# Patient Record
Sex: MALE | Race: BLACK OR AFRICAN AMERICAN | NOT HISPANIC OR LATINO | Employment: OTHER | URBAN - METROPOLITAN AREA
[De-identification: names, ages, dates, MRNs, and addresses within clinical notes are randomized per-mention and may not be internally consistent; named-entity substitution may affect disease eponyms.]

---

## 2021-03-05 ENCOUNTER — IMMUNIZATION (OUTPATIENT)
Dept: PRIMARY CARE CLINIC | Facility: CLINIC | Age: 66
End: 2021-03-05

## 2021-03-05 DIAGNOSIS — Z23 NEED FOR VACCINATION: Primary | ICD-10-CM

## 2021-03-05 PROCEDURE — 91303 PR SARSCOV2 VAC AD26 .5ML IM: CPT | Mod: S$GLB,,, | Performed by: INTERNAL MEDICINE

## 2021-03-05 PROCEDURE — 91303 PR SARSCOV2 VAC AD26 .5ML IM: ICD-10-PCS | Mod: S$GLB,,, | Performed by: INTERNAL MEDICINE

## 2021-03-05 PROCEDURE — 0031A PR IMMUNIZ ADMIN, SARS-COV-2 COVID-19 VACC, 5X10VP/0.5ML: CPT | Mod: CV19,S$GLB,, | Performed by: INTERNAL MEDICINE

## 2021-03-05 PROCEDURE — 0031A PR IMMUNIZ ADMIN, SARS-COV-2 COVID-19 VACC, 5X10VP/0.5ML: ICD-10-PCS | Mod: CV19,S$GLB,, | Performed by: INTERNAL MEDICINE

## 2021-08-31 ENCOUNTER — PATIENT OUTREACH (OUTPATIENT)
Dept: ADMINISTRATIVE | Facility: HOSPITAL | Age: 66
End: 2021-08-31

## 2021-09-14 ENCOUNTER — TELEPHONE (OUTPATIENT)
Dept: FAMILY MEDICINE | Facility: CLINIC | Age: 66
End: 2021-09-14

## 2021-09-14 ENCOUNTER — OFFICE VISIT (OUTPATIENT)
Dept: FAMILY MEDICINE | Facility: CLINIC | Age: 66
End: 2021-09-14
Payer: MEDICARE

## 2021-09-14 VITALS
SYSTOLIC BLOOD PRESSURE: 148 MMHG | DIASTOLIC BLOOD PRESSURE: 62 MMHG | BODY MASS INDEX: 24.47 KG/M2 | HEIGHT: 67 IN | WEIGHT: 155.88 LBS | HEART RATE: 45 BPM | OXYGEN SATURATION: 100 % | TEMPERATURE: 98 F

## 2021-09-14 DIAGNOSIS — Z00.00 ROUTINE GENERAL MEDICAL EXAMINATION AT A HEALTH CARE FACILITY: Primary | ICD-10-CM

## 2021-09-14 DIAGNOSIS — I10 HYPERTENSION, UNSPECIFIED TYPE: ICD-10-CM

## 2021-09-14 DIAGNOSIS — Z12.11 COLON CANCER SCREENING: ICD-10-CM

## 2021-09-14 DIAGNOSIS — R00.1 SINUS BRADYCARDIA: ICD-10-CM

## 2021-09-14 DIAGNOSIS — N40.0 BENIGN PROSTATIC HYPERPLASIA, UNSPECIFIED WHETHER LOWER URINARY TRACT SYMPTOMS PRESENT: ICD-10-CM

## 2021-09-14 DIAGNOSIS — I49.9 IRREGULAR HEART RATE: ICD-10-CM

## 2021-09-14 DIAGNOSIS — R94.31 ABNORMAL EKG: ICD-10-CM

## 2021-09-14 DIAGNOSIS — Z12.5 SCREENING FOR MALIGNANT NEOPLASM OF PROSTATE: ICD-10-CM

## 2021-09-14 PROCEDURE — 93005 ELECTROCARDIOGRAM TRACING: CPT | Mod: PBBFAC,PO | Performed by: INTERNAL MEDICINE

## 2021-09-14 PROCEDURE — 99999 PR PBB SHADOW E&M-EST. PATIENT-LVL III: ICD-10-PCS | Mod: PBBFAC,,, | Performed by: FAMILY MEDICINE

## 2021-09-14 PROCEDURE — 99387 INIT PM E/M NEW PAT 65+ YRS: CPT | Mod: S$PBB,,, | Performed by: FAMILY MEDICINE

## 2021-09-14 PROCEDURE — 99387 PR PREVENTIVE VISIT,NEW,65 & OVER: ICD-10-PCS | Mod: S$PBB,,, | Performed by: FAMILY MEDICINE

## 2021-09-14 PROCEDURE — 99213 OFFICE O/P EST LOW 20 MIN: CPT | Mod: PBBFAC,PO | Performed by: FAMILY MEDICINE

## 2021-09-14 PROCEDURE — 99999 PR PBB SHADOW E&M-EST. PATIENT-LVL III: CPT | Mod: PBBFAC,,, | Performed by: FAMILY MEDICINE

## 2021-09-14 PROCEDURE — 93010 EKG 12-LEAD: ICD-10-PCS | Mod: S$PBB,,, | Performed by: INTERNAL MEDICINE

## 2021-09-14 PROCEDURE — 90670 PCV13 VACCINE IM: CPT | Mod: PBBFAC,PO

## 2021-09-14 PROCEDURE — 93010 ELECTROCARDIOGRAM REPORT: CPT | Mod: S$PBB,,, | Performed by: INTERNAL MEDICINE

## 2021-09-14 PROCEDURE — G0009 ADMIN PNEUMOCOCCAL VACCINE: HCPCS | Mod: PBBFAC,PO

## 2021-09-14 RX ORDER — AMLODIPINE BESYLATE 5 MG/1
5 TABLET ORAL DAILY
Qty: 30 TABLET | Refills: 11 | Status: SHIPPED | OUTPATIENT
Start: 2021-09-14 | End: 2021-12-16 | Stop reason: SDUPTHER

## 2021-09-14 RX ORDER — TAMSULOSIN HYDROCHLORIDE 0.4 MG/1
0.4 CAPSULE ORAL DAILY
Qty: 90 CAPSULE | Refills: 3 | Status: SHIPPED | OUTPATIENT
Start: 2021-09-14 | End: 2022-10-25 | Stop reason: SDUPTHER

## 2021-09-29 ENCOUNTER — HOSPITAL ENCOUNTER (OUTPATIENT)
Dept: CARDIOLOGY | Facility: HOSPITAL | Age: 66
Discharge: HOME | End: 2021-09-29
Attending: FAMILY MEDICINE
Payer: MEDICARE

## 2021-09-29 ENCOUNTER — PATIENT MESSAGE (OUTPATIENT)
Dept: FAMILY MEDICINE | Facility: CLINIC | Age: 66
End: 2021-09-29

## 2021-09-29 ENCOUNTER — CLINICAL SUPPORT (OUTPATIENT)
Dept: LAB | Facility: HOSPITAL | Age: 66
End: 2021-09-29
Attending: FAMILY MEDICINE
Payer: MEDICARE

## 2021-09-29 VITALS — WEIGHT: 155 LBS | BODY MASS INDEX: 24.33 KG/M2 | HEIGHT: 67 IN

## 2021-09-29 DIAGNOSIS — I49.9 IRREGULAR HEART RATE: ICD-10-CM

## 2021-09-29 DIAGNOSIS — R94.31 ABNORMAL EKG: ICD-10-CM

## 2021-09-29 DIAGNOSIS — R97.20 ELEVATED PSA: Primary | ICD-10-CM

## 2021-09-29 DIAGNOSIS — R00.1 SINUS BRADYCARDIA: ICD-10-CM

## 2021-09-29 LAB
AORTIC ROOT ANNULUS: 2.73 CM
AORTIC VALVE CUSP SEPERATION: 1.76 CM
AV INDEX (PROSTH): 0.55
AV MEAN GRADIENT: 6 MMHG
AV PEAK GRADIENT: 11 MMHG
AV VALVE AREA: 1.78 CM2
AV VELOCITY RATIO: 0.5
BSA FOR ECHO PROCEDURE: 1.82 M2
CV ECHO LV RWT: 0.34 CM
DOP CALC AO PEAK VEL: 1.69 M/S
DOP CALC AO VTI: 37.38 CM
DOP CALC LVOT AREA: 3.2 CM2
DOP CALC LVOT DIAMETER: 2.02 CM
DOP CALC LVOT PEAK VEL: 0.84 M/S
DOP CALC LVOT STROKE VOLUME: 66.43 CM3
DOP CALC MV VTI: 44.28 CM
DOP CALCLVOT PEAK VEL VTI: 20.74 CM
E WAVE DECELERATION TIME: 271.9 MSEC
E/A RATIO: 0.79
E/E' RATIO: 7.33 M/S
ECHO LV POSTERIOR WALL: 0.94 CM (ref 0.6–1.1)
EJECTION FRACTION: 60 %
FRACTIONAL SHORTENING: 32 % (ref 28–44)
INTERVENTRICULAR SEPTUM: 0.74 CM (ref 0.6–1.1)
IVRT: 105.61 MSEC
LA MAJOR: 5.69 CM
LA MINOR: 6.03 CM
LA WIDTH: 4.17 CM
LEFT ATRIUM SIZE: 3.38 CM
LEFT ATRIUM VOLUME INDEX MOD: 32.8 ML/M2
LEFT ATRIUM VOLUME INDEX: 38.8 ML/M2
LEFT ATRIUM VOLUME MOD: 59.31 CM3
LEFT ATRIUM VOLUME: 70.15 CM3
LEFT INTERNAL DIMENSION IN SYSTOLE: 3.79 CM (ref 2.1–4)
LEFT VENTRICLE DIASTOLIC VOLUME INDEX: 84.55 ML/M2
LEFT VENTRICLE DIASTOLIC VOLUME: 153.03 ML
LEFT VENTRICLE MASS INDEX: 97 G/M2
LEFT VENTRICLE SYSTOLIC VOLUME INDEX: 34 ML/M2
LEFT VENTRICLE SYSTOLIC VOLUME: 61.5 ML
LEFT VENTRICULAR INTERNAL DIMENSION IN DIASTOLE: 5.59 CM (ref 3.5–6)
LEFT VENTRICULAR MASS: 174.96 G
LV LATERAL E/E' RATIO: 5.5 M/S
LV SEPTAL E/E' RATIO: 11 M/S
MV MEAN GRADIENT: 0 MMHG
MV PEAK A VEL: 0.7 M/S
MV PEAK E VEL: 0.55 M/S
MV PEAK GRADIENT: 2 MMHG
MV STENOSIS PRESSURE HALF TIME: 78.85 MS
MV VALVE AREA BY CONTINUITY EQUATION: 1.5 CM2
MV VALVE AREA P 1/2 METHOD: 2.79 CM2
PISA MRMAX VEL: 0.05 M/S
PISA TR MAX VEL: 2.53 M/S
PV PEAK VELOCITY: 1.19 CM/S
RA MAJOR: 4.73 CM
RA PRESSURE: 3 MMHG
RA WIDTH: 4.22 CM
RIGHT VENTRICULAR END-DIASTOLIC DIMENSION: 2.16 CM
TDI LATERAL: 0.1 M/S
TDI SEPTAL: 0.05 M/S
TDI: 0.08 M/S
TR MAX PG: 26 MMHG
TV REST PULMONARY ARTERY PRESSURE: 29 MMHG

## 2021-09-29 PROCEDURE — 93010 EKG 12-LEAD: ICD-10-PCS | Mod: ,,, | Performed by: INTERNAL MEDICINE

## 2021-09-29 PROCEDURE — 93010 ELECTROCARDIOGRAM REPORT: CPT | Mod: ,,, | Performed by: INTERNAL MEDICINE

## 2021-09-29 PROCEDURE — 93306 TTE W/DOPPLER COMPLETE: CPT | Mod: 26,,, | Performed by: INTERNAL MEDICINE

## 2021-09-29 PROCEDURE — 93005 ELECTROCARDIOGRAM TRACING: CPT

## 2021-09-29 PROCEDURE — 93306 ECHO (CUPID ONLY): ICD-10-PCS | Mod: 26,,, | Performed by: INTERNAL MEDICINE

## 2021-09-29 PROCEDURE — 93306 TTE W/DOPPLER COMPLETE: CPT

## 2021-10-01 DIAGNOSIS — I49.9 IRREGULAR HEART RATE: Primary | ICD-10-CM

## 2021-10-04 ENCOUNTER — TELEPHONE (OUTPATIENT)
Dept: FAMILY MEDICINE | Facility: CLINIC | Age: 66
End: 2021-10-04

## 2021-10-20 ENCOUNTER — TELEPHONE (OUTPATIENT)
Dept: GASTROENTEROLOGY | Facility: CLINIC | Age: 66
End: 2021-10-20

## 2021-11-02 ENCOUNTER — TELEPHONE (OUTPATIENT)
Dept: ENDOSCOPY | Facility: HOSPITAL | Age: 66
End: 2021-11-02
Payer: MEDICARE

## 2021-11-02 ENCOUNTER — TELEPHONE (OUTPATIENT)
Dept: FAMILY MEDICINE | Facility: CLINIC | Age: 66
End: 2021-11-02
Payer: MEDICARE

## 2021-11-02 RX ORDER — SODIUM, POTASSIUM,MAG SULFATES 17.5-3.13G
1 SOLUTION, RECONSTITUTED, ORAL ORAL DAILY
Qty: 1 KIT | Refills: 0 | Status: SHIPPED | OUTPATIENT
Start: 2021-11-02 | End: 2021-11-04

## 2021-11-10 ENCOUNTER — IMMUNIZATION (OUTPATIENT)
Dept: PRIMARY CARE CLINIC | Facility: CLINIC | Age: 66
End: 2021-11-10
Payer: MEDICARE

## 2021-11-10 DIAGNOSIS — Z23 NEED FOR VACCINATION: Primary | ICD-10-CM

## 2021-11-10 PROCEDURE — 0064A COVID-19, MRNA, LNP-S, PF, 100 MCG/0.25 ML DOSE VACCINE (MODERNA BOOSTER): CPT | Mod: CV19,PBBFAC | Performed by: INTERNAL MEDICINE

## 2021-11-19 ENCOUNTER — OFFICE VISIT (OUTPATIENT)
Dept: UROLOGY | Facility: CLINIC | Age: 66
End: 2021-11-19
Payer: MEDICARE

## 2021-11-19 VITALS
SYSTOLIC BLOOD PRESSURE: 144 MMHG | HEIGHT: 67 IN | WEIGHT: 155 LBS | DIASTOLIC BLOOD PRESSURE: 92 MMHG | HEART RATE: 51 BPM | BODY MASS INDEX: 24.33 KG/M2

## 2021-11-19 DIAGNOSIS — R97.20 ELEVATED PSA: ICD-10-CM

## 2021-11-19 DIAGNOSIS — R35.0 FREQUENCY OF URINATION: ICD-10-CM

## 2021-11-19 PROCEDURE — 99999 PR PBB SHADOW E&M-EST. PATIENT-LVL III: ICD-10-PCS | Mod: PBBFAC,,, | Performed by: UROLOGY

## 2021-11-19 PROCEDURE — 99205 PR OFFICE/OUTPT VISIT, NEW, LEVL V, 60-74 MIN: ICD-10-PCS | Mod: S$PBB,,, | Performed by: UROLOGY

## 2021-11-19 PROCEDURE — 99213 OFFICE O/P EST LOW 20 MIN: CPT | Mod: PBBFAC,PO | Performed by: UROLOGY

## 2021-11-19 PROCEDURE — 99205 OFFICE O/P NEW HI 60 MIN: CPT | Mod: S$PBB,,, | Performed by: UROLOGY

## 2021-11-19 PROCEDURE — 87086 URINE CULTURE/COLONY COUNT: CPT | Performed by: UROLOGY

## 2021-11-19 PROCEDURE — 99999 PR PBB SHADOW E&M-EST. PATIENT-LVL III: CPT | Mod: PBBFAC,,, | Performed by: UROLOGY

## 2021-11-19 RX ORDER — CIPROFLOXACIN 500 MG/1
500 TABLET ORAL 2 TIMES DAILY
Qty: 4 TABLET | Refills: 0 | Status: SHIPPED | OUTPATIENT
Start: 2021-11-19 | End: 2021-11-21

## 2021-11-21 LAB — BACTERIA UR CULT: NO GROWTH

## 2021-11-24 ENCOUNTER — HOSPITAL ENCOUNTER (OUTPATIENT)
Dept: RADIOLOGY | Facility: HOSPITAL | Age: 66
Discharge: HOME | End: 2021-11-24
Attending: UROLOGY
Payer: MEDICARE

## 2021-11-24 DIAGNOSIS — R97.20 ELEVATED PSA: ICD-10-CM

## 2021-11-24 PROCEDURE — 78306 NM BONE SCAN WHOLE BODY: ICD-10-PCS | Mod: 26,,, | Performed by: STUDENT IN AN ORGANIZED HEALTH CARE EDUCATION/TRAINING PROGRAM

## 2021-11-24 PROCEDURE — 78306 BONE IMAGING WHOLE BODY: CPT | Mod: 26,,, | Performed by: STUDENT IN AN ORGANIZED HEALTH CARE EDUCATION/TRAINING PROGRAM

## 2021-11-24 PROCEDURE — A9503 TC99M MEDRONATE: HCPCS

## 2021-11-24 PROCEDURE — 78306 BONE IMAGING WHOLE BODY: CPT | Mod: TC

## 2021-11-29 ENCOUNTER — TELEPHONE (OUTPATIENT)
Dept: UROLOGY | Facility: CLINIC | Age: 66
End: 2021-11-29
Payer: MEDICARE

## 2021-11-29 RX ORDER — CIPROFLOXACIN 500 MG/1
500 TABLET ORAL 2 TIMES DAILY
Qty: 4 TABLET | Refills: 0 | Status: SHIPPED | OUTPATIENT
Start: 2021-12-12 | End: 2021-12-14

## 2021-12-06 ENCOUNTER — HOSPITAL ENCOUNTER (OUTPATIENT)
Dept: RADIOLOGY | Facility: HOSPITAL | Age: 66
Discharge: HOME | End: 2021-12-06
Attending: UROLOGY
Payer: MEDICARE

## 2021-12-06 DIAGNOSIS — R97.20 ELEVATED PSA: ICD-10-CM

## 2021-12-06 PROCEDURE — 74178 CT ABDOMEN PELVIS W WO CONTRAST: ICD-10-PCS | Mod: 26,,, | Performed by: RADIOLOGY

## 2021-12-06 PROCEDURE — 74178 CT ABD&PLV WO CNTR FLWD CNTR: CPT | Mod: 26,,, | Performed by: RADIOLOGY

## 2021-12-06 PROCEDURE — 25500020 PHARM REV CODE 255: Performed by: UROLOGY

## 2021-12-06 PROCEDURE — 74178 CT ABD&PLV WO CNTR FLWD CNTR: CPT | Mod: TC

## 2021-12-06 RX ADMIN — IOHEXOL 75 ML: 350 INJECTION, SOLUTION INTRAVENOUS at 04:12

## 2021-12-10 ENCOUNTER — TELEPHONE (OUTPATIENT)
Dept: UROLOGY | Facility: CLINIC | Age: 66
End: 2021-12-10
Payer: MEDICARE

## 2021-12-13 ENCOUNTER — TELEPHONE (OUTPATIENT)
Dept: UROLOGY | Facility: CLINIC | Age: 66
End: 2021-12-13
Payer: MEDICARE

## 2021-12-16 ENCOUNTER — OFFICE VISIT (OUTPATIENT)
Dept: FAMILY MEDICINE | Facility: CLINIC | Age: 66
End: 2021-12-16
Payer: MEDICARE

## 2021-12-16 VITALS
HEART RATE: 53 BPM | SYSTOLIC BLOOD PRESSURE: 166 MMHG | BODY MASS INDEX: 25.06 KG/M2 | DIASTOLIC BLOOD PRESSURE: 92 MMHG | OXYGEN SATURATION: 99 % | HEIGHT: 67 IN | WEIGHT: 159.63 LBS

## 2021-12-16 DIAGNOSIS — I10 HYPERTENSION, UNSPECIFIED TYPE: ICD-10-CM

## 2021-12-16 DIAGNOSIS — R00.1 SINUS BRADYCARDIA: ICD-10-CM

## 2021-12-16 DIAGNOSIS — R97.20 ELEVATED PSA: Primary | ICD-10-CM

## 2021-12-16 PROCEDURE — 99999 PR PBB SHADOW E&M-EST. PATIENT-LVL III: CPT | Mod: PBBFAC,,, | Performed by: FAMILY MEDICINE

## 2021-12-16 PROCEDURE — 99999 PR PBB SHADOW E&M-EST. PATIENT-LVL III: ICD-10-PCS | Mod: PBBFAC,,, | Performed by: FAMILY MEDICINE

## 2021-12-16 PROCEDURE — 99214 OFFICE O/P EST MOD 30 MIN: CPT | Mod: 25,S$PBB,, | Performed by: FAMILY MEDICINE

## 2021-12-16 PROCEDURE — 99214 PR OFFICE/OUTPT VISIT, EST, LEVL IV, 30-39 MIN: ICD-10-PCS | Mod: 25,S$PBB,, | Performed by: FAMILY MEDICINE

## 2021-12-16 PROCEDURE — 99213 OFFICE O/P EST LOW 20 MIN: CPT | Mod: PBBFAC,PO,25 | Performed by: FAMILY MEDICINE

## 2021-12-16 PROCEDURE — G0008 ADMIN INFLUENZA VIRUS VAC: HCPCS | Mod: PBBFAC,PO

## 2021-12-16 PROCEDURE — 90662 IIV NO PRSV INCREASED AG IM: CPT | Mod: PBBFAC,PO

## 2021-12-16 RX ORDER — AMLODIPINE BESYLATE 10 MG/1
10 TABLET ORAL DAILY
Qty: 30 TABLET | Refills: 11 | Status: SHIPPED | OUTPATIENT
Start: 2021-12-16 | End: 2023-03-07 | Stop reason: SDUPTHER

## 2021-12-20 ENCOUNTER — TELEPHONE (OUTPATIENT)
Dept: ENDOSCOPY | Facility: HOSPITAL | Age: 66
End: 2021-12-20
Payer: MEDICARE

## 2021-12-22 ENCOUNTER — HOSPITAL ENCOUNTER (OUTPATIENT)
Facility: HOSPITAL | Age: 66
Discharge: HOME | End: 2021-12-22
Attending: INTERNAL MEDICINE | Admitting: INTERNAL MEDICINE
Payer: MEDICARE

## 2021-12-22 ENCOUNTER — ANESTHESIA EVENT (OUTPATIENT)
Dept: ENDOSCOPY | Facility: HOSPITAL | Age: 66
End: 2021-12-22
Payer: MEDICARE

## 2021-12-22 ENCOUNTER — ANESTHESIA (OUTPATIENT)
Dept: ENDOSCOPY | Facility: HOSPITAL | Age: 66
End: 2021-12-22
Payer: MEDICARE

## 2021-12-22 VITALS
WEIGHT: 158 LBS | TEMPERATURE: 98 F | HEIGHT: 69 IN | BODY MASS INDEX: 23.4 KG/M2 | OXYGEN SATURATION: 97 % | SYSTOLIC BLOOD PRESSURE: 133 MMHG | RESPIRATION RATE: 17 BRPM | HEART RATE: 49 BPM | DIASTOLIC BLOOD PRESSURE: 86 MMHG

## 2021-12-22 DIAGNOSIS — Z12.11 COLON CANCER SCREENING: ICD-10-CM

## 2021-12-22 PROCEDURE — 63600175 PHARM REV CODE 636 W HCPCS: Performed by: NURSE ANESTHETIST, CERTIFIED REGISTERED

## 2021-12-22 PROCEDURE — 37000008 HC ANESTHESIA 1ST 15 MINUTES: Performed by: INTERNAL MEDICINE

## 2021-12-22 PROCEDURE — 27201089 HC SNARE, DISP (ANY): Performed by: INTERNAL MEDICINE

## 2021-12-22 PROCEDURE — 45385 COLONOSCOPY W/LESION REMOVAL: CPT | Mod: PT,,, | Performed by: INTERNAL MEDICINE

## 2021-12-22 PROCEDURE — 88305 TISSUE EXAM BY PATHOLOGIST: CPT | Mod: 26,,, | Performed by: STUDENT IN AN ORGANIZED HEALTH CARE EDUCATION/TRAINING PROGRAM

## 2021-12-22 PROCEDURE — 88305 TISSUE EXAM BY PATHOLOGIST: ICD-10-PCS | Mod: 26,,, | Performed by: STUDENT IN AN ORGANIZED HEALTH CARE EDUCATION/TRAINING PROGRAM

## 2021-12-22 PROCEDURE — 25000003 PHARM REV CODE 250: Performed by: NURSE ANESTHETIST, CERTIFIED REGISTERED

## 2021-12-22 PROCEDURE — 45385 PR COLONOSCOPY,REMV LESN,SNARE: ICD-10-PCS | Mod: PT,,, | Performed by: INTERNAL MEDICINE

## 2021-12-22 PROCEDURE — 88305 TISSUE EXAM BY PATHOLOGIST: CPT | Performed by: STUDENT IN AN ORGANIZED HEALTH CARE EDUCATION/TRAINING PROGRAM

## 2021-12-22 PROCEDURE — 37000009 HC ANESTHESIA EA ADD 15 MINS: Performed by: INTERNAL MEDICINE

## 2021-12-22 PROCEDURE — 45385 COLONOSCOPY W/LESION REMOVAL: CPT | Mod: PT | Performed by: INTERNAL MEDICINE

## 2021-12-22 PROCEDURE — 25000003 PHARM REV CODE 250: Performed by: INTERNAL MEDICINE

## 2021-12-22 RX ORDER — LIDOCAINE HYDROCHLORIDE 20 MG/ML
INJECTION INTRAVENOUS
Status: DISCONTINUED | OUTPATIENT
Start: 2021-12-22 | End: 2021-12-22

## 2021-12-22 RX ORDER — PROPOFOL 10 MG/ML
VIAL (ML) INTRAVENOUS CONTINUOUS PRN
Status: DISCONTINUED | OUTPATIENT
Start: 2021-12-22 | End: 2021-12-22

## 2021-12-22 RX ORDER — SODIUM CHLORIDE 0.9 % (FLUSH) 0.9 %
10 SYRINGE (ML) INJECTION
Status: DISCONTINUED | OUTPATIENT
Start: 2021-12-22 | End: 2021-12-22 | Stop reason: HOSPADM

## 2021-12-22 RX ORDER — SODIUM CHLORIDE 9 MG/ML
INJECTION, SOLUTION INTRAVENOUS CONTINUOUS
Status: DISCONTINUED | OUTPATIENT
Start: 2021-12-22 | End: 2021-12-22 | Stop reason: HOSPADM

## 2021-12-22 RX ORDER — PROPOFOL 10 MG/ML
VIAL (ML) INTRAVENOUS
Status: DISCONTINUED | OUTPATIENT
Start: 2021-12-22 | End: 2021-12-22

## 2021-12-22 RX ADMIN — PROPOFOL 150 MCG/KG/MIN: 10 INJECTION, EMULSION INTRAVENOUS at 08:12

## 2021-12-22 RX ADMIN — PROPOFOL 70 MG: 10 INJECTION, EMULSION INTRAVENOUS at 08:12

## 2021-12-22 RX ADMIN — LIDOCAINE HYDROCHLORIDE 60 MG: 20 INJECTION, SOLUTION INTRAVENOUS at 08:12

## 2021-12-22 RX ADMIN — SODIUM CHLORIDE: 0.9 INJECTION, SOLUTION INTRAVENOUS at 08:12

## 2021-12-22 NOTE — H&P
Short Stay Endoscopy History and Physical    PCP - Demetri Thomason MD    Procedure - Colonoscopy  ASA - III  Mallampati - per anesthesia  History of Anesthesia problems - no  Family history Anesthesia problems - no     HPI:  This is a 66 y.o. male here for evaluation of : Colon cancer screening    Family history of colon cancer - no  History of polyps - na  Change in bowel habits - no  Rectal bleeding - no      ROS:  Constitutional: No fevers, chills, No weight loss  ENT: No allergies  CV: No chest pain  Pulm: No shortness of breath  GI: see HPI  Derm: No rash    Medical History:  has a past medical history of HTN (hypertension).    Surgical History:  has a past surgical history that includes hand surgery.    Family History: family history is not on file.. Otherwise no colon cancer, inflammatory bowel disease, or GI malignancies.    Social History:  reports that he has never smoked. He has never used smokeless tobacco. He reports current drug use. Drug: Marijuana. He reports that he does not drink alcohol.    Review of patient's allergies indicates:   Allergen Reactions    Nyquil liquicaps Swelling       Medications:   Medications Prior to Admission   Medication Sig Dispense Refill Last Dose    amLODIPine (NORVASC) 10 MG tablet Take 1 tablet (10 mg total) by mouth once daily. 30 tablet 11     tamsulosin (FLOMAX) 0.4 mg Cap Take 1 capsule (0.4 mg total) by mouth once daily. 90 capsule 3          Objective Findings:    Vital Signs: see nursing notes  Physical Exam:  General Appearance: Well appearing in no acute distress  Eyes:    No scleral icterus  ENT: Neck supple  Lungs: CTA anteriorly  Heart:  S1, S2 normal, no murmurs heard  Abdomen: Soft, non tender, non distended with positive bowel sounds. No hepatosplenomegaly, ascites, or mass  Extremities: no edema  Skin: No rash      Labs:  Lab Results   Component Value Date    WBC 4.17 09/29/2021    HGB 13.1 (L) 09/29/2021    HCT 40.9 09/29/2021     09/29/2021     CHOL 207 (H) 09/29/2021    TRIG 88 09/29/2021    HDL 65 09/29/2021    ALT 25 09/29/2021    AST 34 09/29/2021     11/24/2021    K 4.2 11/24/2021     11/24/2021    CREATININE 1.4 11/24/2021    BUN 28 (H) 11/24/2021    CO2 29 11/24/2021    TSH 1.116 09/29/2021    .3 (H) 09/29/2021       I have explained the risks and benefits of endoscopy procedures to the patient including but not limited to bleeding, perforation, infection, and death.    Lavon Solis MD

## 2021-12-22 NOTE — PROVATION PATIENT INSTRUCTIONS
Discharge Summary/Instructions after an Endoscopic Procedure  Patient Name: Jm Gilliam  Patient MRN: [id number]  Patient YOB: 1955  Wednesday, December 22, 2021  Lavon Solis MD  Dear patient,  As a result of recent federal legislation (The Federal Cures Act), you may   receive lab or pathology results from your procedure in your [facility]   account before your physician is able to contact you. Your physician or   their representative will relay the results to you with their   recommendations at their soonest availability.  Thank you,  Your health is very important to us during the Covid Crisis. Following your   procedure today, you will receive a daily text for 2 weeks asking about   signs or symptoms of Covid 19.  Please respond to this text when you   receive it so we can follow up and keep you as safe as possible.   RESTRICTIONS:  During your procedure today, you received medications for sedation.  These   medications may affect your judgment, balance and coordination.  Therefore,   for 24 hours, you have the following restrictions:   - DO NOT drive a car, operate machinery, make legal/financial decisions,   sign important papers or drink alcohol.    ACTIVITY:  Today: no heavy lifting, straining or running due to procedural   sedation/anesthesia.  The following day: return to full activity including work.  DIET:  Eat and drink normally unless instructed otherwise.     TREATMENT FOR COMMON SIDE EFFECTS:  - Mild abdominal pain, nausea, belching, bloating or excessive gas:  rest,   eat lightly and use a heating pad.  - Sore Throat: treat with throat lozenges and/or gargle with warm salt   water.  - Because air was used during the procedure, expelling large amounts of air   from your rectum or belching is normal.  - If a bowel prep was taken, you may not have a bowel movement for 1-3 days.    This is normal.  SYMPTOMS TO WATCH FOR AND REPORT TO YOUR PHYSICIAN:  1. Abdominal pain or bloating,  other than gas cramps.  2. Chest pain.  3. Back pain.  4. Signs of infection such as: chills or fever occurring within 24 hours   after the procedure.  5. Rectal bleeding, which would show as bright red, maroon, or black stools.   (A tablespoon of blood from the rectum is not serious, especially if   hemorrhoids are present.)  6. Vomiting.  7. Weakness or dizziness.  GO DIRECTLY TO THE NEAREST EMERGENCY ROOM IF YOU HAVE ANY OF THE FOLLOWING:      Difficulty breathing              Chills and/or fever over 101 F   Persistent vomiting and/or vomiting blood   Severe abdominal pain   Severe chest pain   Black, tarry stools   Bleeding- more than one tablespoon   Any other symptom or condition that you feel may need urgent attention  Your doctor recommends these additional instructions:  If any biopsies were taken, your doctors clinic will contact you in 1 to 2   weeks with any results.  - Discharge patient to home.   - Resume previous diet.   - Continue present medications.   - Await pathology results.   - Repeat colonoscopy in 3 years for surveillance.   - Patient has a contact number available for emergencies.  The signs and   symptoms of potential delayed complications were discussed with the   patient.  Return to normal activities tomorrow.  Written discharge   instructions were provided to the patient.  For questions, problems or results please call your physician - Lavon Solis MD.  EMERGENCY PHONE NUMBER: [phone],  LAB RESULTS: [phone]  IF A COMPLICATION OR EMERGENCY SITUATION ARISES AND YOU ARE UNABLE TO REACH   YOUR PHYSICIAN - GO DIRECTLY TO THE EMERGENCY ROOM.  Lavon Solis MD  12/22/2021 9:15:14 AM  This report has been verified and signed electronically.  Dear patient,  As a result of recent federal legislation (The Federal Cures Act), you may   receive lab or pathology results from your procedure in your [facility]   account before your physician is able to contact you.  Your physician or   their representative will relay the results to you with their   recommendations at their soonest availability.  Thank you,  [employer]OVATION

## 2021-12-23 ENCOUNTER — TELEPHONE (OUTPATIENT)
Dept: ENDOSCOPY | Facility: HOSPITAL | Age: 66
End: 2021-12-23
Payer: MEDICARE

## 2022-01-01 LAB
FINAL PATHOLOGIC DIAGNOSIS: NORMAL
GROSS: NORMAL
Lab: NORMAL

## 2022-01-03 ENCOUNTER — TELEPHONE (OUTPATIENT)
Dept: GASTROENTEROLOGY | Facility: CLINIC | Age: 67
End: 2022-01-03
Payer: MEDICARE

## 2022-01-03 NOTE — TELEPHONE ENCOUNTER
----- Message from Lavon Solis MD sent at 1/3/2022  1:39 PM CST -----  Pathology from recent colonoscopy reviewed.  Colon polyp(s) benign.  Repeat colonoscopy in 3 years.

## 2022-01-03 NOTE — PROGRESS NOTES
Pathology from recent colonoscopy reviewed.  Colon polyp(s) benign.  Repeat colonoscopy in 3 years.

## 2022-01-21 ENCOUNTER — TELEPHONE (OUTPATIENT)
Dept: UROLOGY | Facility: CLINIC | Age: 67
End: 2022-01-21
Payer: MEDICARE

## 2022-01-24 ENCOUNTER — PROCEDURE VISIT (OUTPATIENT)
Dept: UROLOGY | Facility: CLINIC | Age: 67
End: 2022-01-24
Payer: MEDICARE

## 2022-01-24 VITALS
HEIGHT: 67 IN | DIASTOLIC BLOOD PRESSURE: 101 MMHG | HEART RATE: 64 BPM | BODY MASS INDEX: 24.44 KG/M2 | TEMPERATURE: 98 F | RESPIRATION RATE: 16 BRPM | SYSTOLIC BLOOD PRESSURE: 167 MMHG | WEIGHT: 155.69 LBS

## 2022-01-24 DIAGNOSIS — R97.20 ELEVATED PSA: Primary | ICD-10-CM

## 2022-01-24 PROCEDURE — 88305 TISSUE EXAM BY PATHOLOGIST: CPT | Performed by: PATHOLOGY

## 2022-01-24 PROCEDURE — 96372 THER/PROPH/DIAG INJ SC/IM: CPT | Mod: PBBFAC,59

## 2022-01-24 PROCEDURE — G0416 PROSTATE BIOPSY, ANY MTHD: ICD-10-PCS | Mod: 26,,, | Performed by: PATHOLOGY

## 2022-01-24 PROCEDURE — 55700 TRUS: ICD-10-PCS | Mod: S$PBB,,, | Performed by: UROLOGY

## 2022-01-24 PROCEDURE — 88342 CHG IMMUNOCYTOCHEMISTRY: ICD-10-PCS | Mod: 26,,, | Performed by: PATHOLOGY

## 2022-01-24 PROCEDURE — 76872 US TRANSRECTAL: CPT | Mod: 26,S$PBB,, | Performed by: UROLOGY

## 2022-01-24 PROCEDURE — 88341 PR IHC OR ICC EACH ADD'L SINGLE ANTIBODY  STAINPR: ICD-10-PCS | Mod: 26,,, | Performed by: PATHOLOGY

## 2022-01-24 PROCEDURE — 88342 IMHCHEM/IMCYTCHM 1ST ANTB: CPT | Mod: 26,,, | Performed by: PATHOLOGY

## 2022-01-24 PROCEDURE — 76872 TRUS: ICD-10-PCS | Mod: 26,S$PBB,, | Performed by: UROLOGY

## 2022-01-24 PROCEDURE — G0416 PROSTATE BIOPSY, ANY MTHD: HCPCS | Mod: 26,,, | Performed by: PATHOLOGY

## 2022-01-24 PROCEDURE — 88342 IMHCHEM/IMCYTCHM 1ST ANTB: CPT | Mod: 59 | Performed by: PATHOLOGY

## 2022-01-24 PROCEDURE — 88341 IMHCHEM/IMCYTCHM EA ADD ANTB: CPT | Mod: 26,,, | Performed by: PATHOLOGY

## 2022-01-24 PROCEDURE — 88341 IMHCHEM/IMCYTCHM EA ADD ANTB: CPT | Mod: 59 | Performed by: PATHOLOGY

## 2022-01-24 PROCEDURE — 55700 TRUS: CPT | Mod: PBBFAC | Performed by: UROLOGY

## 2022-01-24 RX ORDER — LIDOCAINE HYDROCHLORIDE 10 MG/ML
20 INJECTION INFILTRATION; PERINEURAL
Status: COMPLETED | OUTPATIENT
Start: 2022-01-24 | End: 2022-01-24

## 2022-01-24 RX ORDER — GENTAMICIN SULFATE 40 MG/ML
80 INJECTION, SOLUTION INTRAMUSCULAR; INTRAVENOUS
Status: COMPLETED | OUTPATIENT
Start: 2022-01-24 | End: 2022-01-24

## 2022-01-24 RX ORDER — LIDOCAINE HYDROCHLORIDE 20 MG/ML
JELLY TOPICAL
Status: COMPLETED | OUTPATIENT
Start: 2022-01-24 | End: 2022-01-24

## 2022-01-24 RX ADMIN — GENTAMICIN SULFATE 80 MG: 40 INJECTION, SOLUTION INTRAMUSCULAR; INTRAVENOUS at 12:01

## 2022-01-24 RX ADMIN — LIDOCAINE HYDROCHLORIDE 20 ML: 10 INJECTION, SOLUTION INFILTRATION; PERINEURAL at 12:01

## 2022-01-24 RX ADMIN — LIDOCAINE HYDROCHLORIDE: 20 JELLY TOPICAL at 12:01

## 2022-01-24 NOTE — PROCEDURES
"TRUS    Date/Time: 1/24/2022 12:48 PM  Performed by: Glenroy Blakely MD  Authorized by: Glenroy Blakely MD     Consent Done?:  Yes (Written)  Time out: Immediately prior to procedure a "time out" was called to verify the correct patient, procedure, equipment, support staff and site/side marked as required.    Preparation: Patient was prepped and draped in usual sterile fashion    Position:  Left lateral  Anesthesia:  Lidocaine jelly and 20cc's 1% Lidocaine  Patient sedated: No    Prostate Size:  66.4  Lesions:: Yes         Type:  Mixed hypo- and hyperechoic  Left Base Biopsies: 1  Left Mid Biopsies: 1  Left [place] Biopsies: 1  Right Base Biopsies: 1  Right Mid Biopsies: 1  Right [place] Biopsies: 1  Total Biopsies:  6    Patient tolerance:  Patient tolerated the procedure well with no immediate complications     Seminal vesicle angles were blunted bilaterally  The borders of the prostate were obscured bilaterally    .4    The patient had some discomfort, therefore only 6 cores were obtained.  The prostate is very hard on examination consistent with prostate cancer, and I felt that 6 cores was adequate tissue    Elevated   Nodular prostate suspicious for prostate cancer    Return to clinic 2 weeks to see me a [facility] to discuss pathology.  He has for me to call him with results are available sooner    Urine cs neg  cipro and gent done  Enemas done      "

## 2022-01-24 NOTE — PATIENT INSTRUCTIONS

## 2022-01-31 LAB
FINAL PATHOLOGIC DIAGNOSIS: NORMAL
GROSS: NORMAL
Lab: NORMAL
MICROSCOPIC EXAM: NORMAL

## 2022-02-04 ENCOUNTER — PATIENT OUTREACH (OUTPATIENT)
Dept: ADMINISTRATIVE | Facility: OTHER | Age: 67
End: 2022-02-04
Payer: MEDICARE

## 2022-02-05 NOTE — PROGRESS NOTES
LINKS immunization registry updated  Care Everywhere updated  Health Maintenance updated  Chart reviewed for overdue [facility] ([facility]) health maintenance testing (CRS, Breast Ca, Diabetic Eye Exam)   Orders entered:N/A

## 2022-02-07 NOTE — PROGRESS NOTES
As requested, Dr Blakely called pt with these results  He will keep appt to see me at [facility] tomorrow

## 2022-02-07 NOTE — PROGRESS NOTES
Subjective:      Jm Gilliam Jr. is a 66 y.o. male who returns today regarding his     Here to discuss path  No complications after bipsy    AUASS 22  Mostly satisffied  .    The following portions of the patient's history were reviewed and updated as appropriate: allergies, current medications, past family history, past medical history, past social history, past surgical history and problem list.    Review of Systems  Pertinent items are noted in HPI.  A comprehensive multipoint review of systems was negative except as otherwise stated in the HPI.    Past Medical History:   Diagnosis Date    HTN (hypertension)      Past Surgical History:   Procedure Laterality Date    COLONOSCOPY N/A 12/22/2021    Procedure: COLONOSCOPY Suprep Vaccinated;  Surgeon: Lavon Solis MD;  Location: [facility];  Service: Endoscopy;  Laterality: N/A;    hand surgery      for accident left hand    HERNIA REPAIR Left 2005       Review of patient's allergies indicates:   Allergen Reactions    Nyquil liquicaps Swelling          Objective:   Vitals: There were no vitals taken for this visit.    Physical Exam   General: alert and oriented, no acute distress  Respiratory: Symmetric expansion, non-labored breathing  Cardiovascular: no peripheral edema  Abdomen: soft, non distended  Skin: normal coloration and turgor, no rashes, no suspicious skin lesions noted  Neuro: no gross deficits  Psych: normal judgment and insight, normal mood/affect and non-anxious    Physical Exam    Lab Review   Urinalysis demonstrates no specimen    Lab Results   Component Value Date    WBC 4.17 09/29/2021    HGB 13.1 (L) 09/29/2021    HCT 40.9 09/29/2021    MCV 91 09/29/2021     09/29/2021     Lab Results   Component Value Date    CREATININE 1.4 11/24/2021    BUN 28 (H) 11/24/2021     Lab Results   Component Value Date    .3 (H) 09/29/2021    PSADIAG 145.4 (H) 11/24/2021     Final Pathologic Diagnosis 1. PROSTATE, LEFT APEX, NEEDLE CORE  BIOPSY:   -  Acinar adenocarcinoma, Grade group 5 ([place] score 4+5=9) in 1 out of 1   core.           Percentage of pattern 4:  50%.           Percentage of pattern 5:  20%.           Percentage of prostatic tissue occupied by tumor:  80%.           Periprostatic fat invasion:  Present.           Perineural invasion:  Present.   -  High-grade prostatic intraepithelial neoplasia (PIN 3).   -  Focal simple glandular atrophy.   -  Focal basal cell hyperplasia.   2. PROSTATE, LEFT MIDDLE, NEEDLE CORE BIOPSY:   -  Acinar adenocarcinoma, Grade group 5 (Muna score 4+5=9) in 1 out of 1   core.           Percentage of pattern 4:  50%.           Percentage of pattern 5:  30%.           Percentage of prostatic tissue occupied by tumor:  95%.           Periprostatic fat invasion:  Not identified.           Perineural invasion:  Present.   -  Focal high-grade prostatic intraepithelial neoplasia (PIN 3).   -  Focal simple glandular atrophy.   -  Focal basal cell hyperplasia.   3. PROSTATE, LEFT BASE, NEEDLE CORE BIOPSY:   -  Acinar adenocarcinoma, Grade group 5 (Muna score 4+5=9) in 1 out of 1   core.           Percentage of pattern 4:  50%.           Percentage of pattern 5:  20%.           Percentage of prostatic tissue occupied by tumor:  90%.           Periprostatic fat invasion:  Not identified.           Perineural invasion:  Present.   -  Focal high-grade prostatic intraepithelial neoplasia (PIN 3).   -  Focal simple glandular atrophy.   -  Focal basal hyperplasia.   -  Focal chronic prostatitis.   4. PROSTATE, RIGHT APEX, NEEDLE CORE BIOPSY:   -  Acinar adenocarcinoma, Grade group 5 (Muna score 4+5=9) in 1 out of 1   core.           Percentage of pattern 4:  70%.           Percentage of pattern 5:  5%.           Percentage of prostatic tissue occupied by tumor:  80%.           Periprostatic fat invasion:  Not identified.           Perineural invasion:  Present.   -  Focal high-grade prostatic intraepithelial  neoplasia (PIN 3).   -  Focal basal cell hyperplasia.   -  Focal chronic prostatitis.   5. PROSTATE RIGHT MIDDLE, NEEDLE CORE BIOPSY:   -  Acinar adenocarcinoma, Grade group 5 ([place] score 4+5=9) in 1 out of 1   core.           Percentage of pattern 4:  60%.           Percentage of pattern 5:  5%.           Percentage of prostatic tissue occupied by tumor:  60%.           Periprostatic fat invasion:  Present.           Perineural invasion:  Present.   -  Focal high-grade prostatic intraepithelial neoplasia (PIN 3).   -  Focal basal cell hyperplasia.   6. PROSTATE, RIGHT BASE, NEEDLE CORE BIOPSY:   -  Acinar adenocarcinoma, Grade group 5 ([place] score 4+5=9) in 1 out of 1   core.           Percentage of pattern 4:  50%.           Percentage of pattern 5:  20%.           Percentage of prostatic tissue occupied by tumor:  85%.           Periprostatic fat invasion:  Not identified.           Perineural invasion:  Present.   -  High-grade prostatic intraepithelial neoplasia (PIN 3).   COMMENT:  Dr. eJro Urbina has reviewed this case and concurs in the diagnosis.    Comment: Interp By Gina Liu MD, Signed on 01/31/2022 at 15:18       Imaging  CT ABDOMEN PELVIS W WO CONTRAST     CLINICAL HISTORY:  Prostate cancer, staging; Elevated prostate specific antigen (PSA)     TECHNIQUE:  Low dose axial images, sagittal and coronal reformations were obtained from the lung bases to the iliac crest following the IV administration of 75 mL of Omnipaque 350 and .  Oral contrast was not administered  Non contrast, arterial, portal venous and delayed phases were acquired over the abdomen.     COMPARISON:  Nuclear medicine bone scan 11/24/2021.     FINDINGS:  LUNG BASES: Unremarkable.     HEPATOBILIARY: Subcentimeter hypodensity in the right lobe of the liver, too small to characterize (series 2, image 16). No biliary ductal dilatation.Normal gallbladder.     SPLEEN: No splenomegaly.     PANCREAS: No focal masses or ductal  dilatation.     ADRENALS: No adrenal nodules.     KIDNEYS/URETERS: At least 2 hypodensities left kidney, the largest measuring 2.4 cm in the lower pole, likely simple renal cysts.No hydronephrosis.No stones.No solid masses.     PELVIC ORGANS/BLADDER: Prostate is enlarged with nodular contour and multiple dystrophic calcifications.  Bladder is unremarkable.     PERITONEUM / RETROPERITONEUM: There are multiple bilateral lower abdominal surgical anchors in the abdominal wall, likely from extraperitoneal hernia repair.  No free air or fluid.     LYMPH NODES: Right inguinal and pelvic borderline enlarged nodes (e.g., 1.2 cm node on series 2, image 129) and 11 mm series 2, image 108.     VESSELS: Diffuse calcific atherosclerosis most prominent in the abdominal aorta and bilateral common iliac arteries.     GI TRACT: No distention or wall thickening. Normal appendix.     BONES AND SOFT TISSUES: No fractures or focal osseous lesions.  Vague hypodensity L3 uncertain significance.     Impression:     1. Enlarged prostate with nodular contour and multiple dystrophic calcifications concerning for prostatic malignancy given clinical history.  2. Right inguinal and pelvic lymphadenopathy concerning for metastatic disease given clinical history.  3. Additional findings as above.     Electronically signed by resident: Galindo Ferguson  Date:                                            12/06/2021  Time:                                           16:11     Electronically signed by: Myles Iglesias MD  Date:                                            12/06/2021  Time:                                           17:24    NM BONE SCAN WHOLE BODY     CLINICAL HISTORY:  Prostate cancer, staging;  Elevated prostate specific antigen (PSA)     TECHNIQUE:  Approximately 2-3 hours following the IV administration of 22.0 mCi of [id number], anterior and posterior delayed whole body images were acquired.  Lateral spot images of the skull were also  acquired.     COMPARISON:  None.     FINDINGS:  There is physiologic distribution of the radiopharmaceutical throughout the skeleton.  Degenerative changes in bilateral shoulders.  Focal uptake of the within the mid lower neck region, only seen on anterior view.  No definite correlate on posterior or lateral views.     There is normal uptake in the genitourinary system and soft tissues.     Impression:     No definite scintigraphic evidence of osseous metastasis.     Nonspecific focal uptake in the mid lower neck on anterior view, possibly related to calcification of thyroid cartilage.     Electronically signed by resident: Cornel Braxton  Date:                                            11/24/2021  Time:                                           13:47     Electronically signed by: Lew Bedolla  Date:                                            11/24/2021  Time:                                           15:38    TRUS 66g     Seminal vesicle angles were blunted bilaterally  The borders of the prostate were obscured bilaterally    Assessment and Plan:   Prostate cancer  Dexter 9 cT3b cNx cMx; psa 153    Axumin PET asap  RTC to see me, heme onc and rad onc asap    We discussed that he has prostate cancer and the high risk nature of his disease.  We discussed the risks and benefits of sugery, radiation, chemotherapy, hormonal therapy, and combinations of these.  We discussed open and robotic surgical approaches.  We discussed that he is likely to need multiple forms of treatment given his high risk disease.  We discussed that active surveillance may not be the best option with high risk disease.  We discussed referral to radiation oncology and medical oncology.      LUTS  PVR and next visit  Consider outlet procedure      I spent 60 min on the day of this encounter preparing for, treating and managing the above

## 2022-02-08 ENCOUNTER — OFFICE VISIT (OUTPATIENT)
Dept: UROLOGY | Facility: CLINIC | Age: 67
End: 2022-02-08
Payer: MEDICARE

## 2022-02-08 ENCOUNTER — OFFICE VISIT (OUTPATIENT)
Dept: RADIATION ONCOLOGY | Facility: CLINIC | Age: 67
End: 2022-02-08
Payer: MEDICARE

## 2022-02-08 VITALS
HEIGHT: 69 IN | BODY MASS INDEX: 23.62 KG/M2 | HEIGHT: 69 IN | WEIGHT: 159.5 LBS | WEIGHT: 159.38 LBS | BODY MASS INDEX: 23.6 KG/M2 | TEMPERATURE: 97 F | OXYGEN SATURATION: 98 % | RESPIRATION RATE: 18 BRPM

## 2022-02-08 DIAGNOSIS — C61 PROSTATE CANCER: Primary | ICD-10-CM

## 2022-02-08 DIAGNOSIS — C61 PROSTATE CANCER: ICD-10-CM

## 2022-02-08 PROCEDURE — 99999 PR PBB SHADOW E&M-EST. PATIENT-LVL III: CPT | Mod: PBBFAC,,, | Performed by: STUDENT IN AN ORGANIZED HEALTH CARE EDUCATION/TRAINING PROGRAM

## 2022-02-08 PROCEDURE — 99215 PR OFFICE/OUTPT VISIT, EST, LEVL V, 40-54 MIN: ICD-10-PCS | Mod: S$PBB,,, | Performed by: UROLOGY

## 2022-02-08 PROCEDURE — 99999 PR PBB SHADOW E&M-EST. PATIENT-LVL III: ICD-10-PCS | Mod: PBBFAC,,, | Performed by: UROLOGY

## 2022-02-08 PROCEDURE — 99999 PR PBB SHADOW E&M-EST. PATIENT-LVL III: CPT | Mod: PBBFAC,,, | Performed by: UROLOGY

## 2022-02-08 PROCEDURE — 99205 PR OFFICE/OUTPT VISIT, NEW, LEVL V, 60-74 MIN: ICD-10-PCS | Mod: S$PBB,,, | Performed by: STUDENT IN AN ORGANIZED HEALTH CARE EDUCATION/TRAINING PROGRAM

## 2022-02-08 PROCEDURE — 99213 OFFICE O/P EST LOW 20 MIN: CPT | Mod: PBBFAC | Performed by: UROLOGY

## 2022-02-08 PROCEDURE — 99213 OFFICE O/P EST LOW 20 MIN: CPT | Mod: PBBFAC,27 | Performed by: STUDENT IN AN ORGANIZED HEALTH CARE EDUCATION/TRAINING PROGRAM

## 2022-02-08 PROCEDURE — 99215 OFFICE O/P EST HI 40 MIN: CPT | Mod: S$PBB,,, | Performed by: UROLOGY

## 2022-02-08 PROCEDURE — 99205 OFFICE O/P NEW HI 60 MIN: CPT | Mod: S$PBB,,, | Performed by: STUDENT IN AN ORGANIZED HEALTH CARE EDUCATION/TRAINING PROGRAM

## 2022-02-08 PROCEDURE — 99999 PR PBB SHADOW E&M-EST. PATIENT-LVL III: ICD-10-PCS | Mod: PBBFAC,,, | Performed by: STUDENT IN AN ORGANIZED HEALTH CARE EDUCATION/TRAINING PROGRAM

## 2022-02-08 NOTE — PROGRESS NOTES
Radiation Oncology Consult Note        Date of Service: 02/08/2022    Chief Complaint: prostate cancer     Reason for visit: consideration for radiation to the pelvis     Referring Physician: Dr Suarez/Reddy (urology)     Therapy to Date:  No radiation    Diagnosis/Assessment:   Jm Gilliam Jr. is a 66 y.o. 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    We discussed treatment options per NCCN guidelines v3.2022 for very high risk localized prostate cancer patients:  - EBRT + long-term ADT (2 years) + dana  - RP +/- PLND     EBRT would consist of daily radiation treatments M-F, 70Gy in 28 fractions to the prostate gland +regional pelvic lymph nodes.      Risks, benefits, and side effects of radiotherapy were discussed.   Side effects include but are not limited to fatigue, erythema, hyperpigmentation, desquamation within the radiation field, more frequent urination, both during the day and at night, urgency with urination, hematuria, dysuria, and a sensation of incomplete emptying.   In addition, the patient will be at risk for increased frequency and/or loose bowel movements.   All of these side effects will go away in the short term.   In the long term, the patient is at risk for radiation cystitis, radiation proctitis, and erectile dysfunction.     - no RT consent was signed today  - Epic-26 survey not filled today  - PET axumin 2/16/2022  - Dr Nanette mayfield 2/22/2022 for systemic therapy  - return to [facility] 3/8/2022 to coordinate care/re-assess plan      HPI:    Jm Gilliam Jr. is a 66 y.o. man with prostate cancer after presenting with elevated PSA.    Oncologic history  2021 .3  2021 Urology (Dr Suarez)   Prostate: nontender; firm nodules in bilateral lobes; size: 50 gm; seminal vesicles not palpated  2021 .4  2021 NM bone scan showed no definite evidence of osseous metastasis.    2021 CT A/P enlarged prostate with nodular contour and multiple dystrophic calcifications, right inguinal 1.2cm  and pelvic 1.1cm lymphadenopathy; multiple bilateral lower abdominal surgical anchors in the abdominal wall, likely from extraperitoneal hernia repair, no bone mets    2021 colonoscopy tubular adenoma, repeat colonoscopy in 3 years.    2022 standard TRUSPB (Dr Blakely)   66.4cc gland  6/6 cores all involved with Grade group 5 ([place] score 4+5=9)    Subjective:   In clinic the patient is accompanied by his wife Angela     The patient reports feeling very well with some urgency and straining.   Denies BM issues, has 1 BM qAM.   Denies any ED issues.  The patient denies other major complaints or any pain,  When speaking of his only child (son) who  at age 37 of COVID 19 in 2020, he and his wife got tearful.    SILVERIO score 20 (4,4,4,4,4), mild ED  AUA score 22 (0,2,1,5,5,5,4) mostly dissatisfied     The patient denies any history of radiation therapy, implantable cardiac devices, or connective tissue disease.    Social history  Lives in [facility] with his wife Angela. Their only child (son)  at age 37 of COVID 19 in 2020. They have grandchildren. He retired from working as  real estate. His wife was a  for [employer]. Denies tobacco or alcohol use.    Family history  no family history of cancers      Current Outpatient Medications on File Prior to Visit   Medication Sig Dispense Refill    amLODIPine (NORVASC) 10 MG tablet Take 1 tablet (10 mg total) by mouth once daily. 30  tablet 11    tamsulosin (FLOMAX) 0.4 mg Cap Take 1 capsule (0.4 mg total) by mouth once daily. 90 capsule 3     No current facility-administered medications on file prior to visit.       Review of patient's allergies indicates:   Allergen Reactions    Nyquil liquicaps Swelling     Past Surgical History:   Procedure Laterality Date    COLONOSCOPY N/A 12/22/2021    Procedure: COLONOSCOPY Suprep Vaccinated;  Surgeon: Lavon Solis MD;  Location: [facility];  Service: Endoscopy;  Laterality: N/A;    hand surgery      for accident left hand    HERNIA REPAIR Left 2005     Past Medical History:   Diagnosis Date    HTN (hypertension)        Review of Systems   Constitutional: Negative for fatigue, fever and unexpected weight change.   HENT: Negative for hearing loss and sinus pressure/congestion.    Eyes: Negative for visual disturbance.   Respiratory: Negative for cough, shortness of breath and wheezing.    Cardiovascular: Negative for chest pain, palpitations and claudication.   Gastrointestinal: Negative for abdominal pain, blood in stool, constipation, diarrhea, nausea, vomiting and reflux.   Genitourinary: Positive for dysuria and frequency. Negative for hematuria and urgency.   Musculoskeletal: Negative for arthralgias, back pain and leg pain.   Integumentary:  Negative for rash.   Neurological: Positive for numbness. Negative for dizziness, seizures, speech difficulty, weakness, headaches and memory loss.   Psychiatric/Behavioral: Negative for dysphoric mood. The patient is not nervous/anxious.          Objective:      Physical Exam  Vitals reviewed.   Constitutional:       Appearance: Normal appearance.   HENT:      Head: Normocephalic and atraumatic.      Mouth/Throat:      Mouth: Mucous membranes are moist.   Eyes:      Conjunctiva/sclera: Conjunctivae normal.   Cardiovascular:      Comments: extremities well perfused  Pulmonary:      Effort: Pulmonary effort is normal.   Abdominal:      General:  There is no distension.   Genitourinary:     Comments: PEYTON deferred (cT2c per Dr. Suarez)  Musculoskeletal:         General: Normal range of motion.      Cervical back: Normal range of motion.   Skin:     General: Skin is warm and dry.   Neurological:      General: No focal deficit present.      Mental Status: He is alert and oriented to person, place, and time.   Psychiatric:         Mood and Affect: Mood normal.         Behavior: Behavior normal.           Imaging: I have personally reviewed the patient's available images and reports and summarized pertinent findings above in HPI.      Pathology: I have personally reviewed the patient's available pathology and summarized pertinent findings above in HPI.      Laboratory: I have personally reviewed the patient's available laboratory values and summarized pertinent findings above in HPI.            I spent approximately 60 minutes reviewing the available records and evaluating the patient, out of which over 50% of the time was spent face to face with the patient in counseling and coordinating this patient's care.     Thank you for the opportunity to care for this patient. Please do not hesitate to contact me with any questions.     Juan King MD/PhD

## 2022-02-16 ENCOUNTER — HOSPITAL ENCOUNTER (OUTPATIENT)
Dept: RADIOLOGY | Facility: HOSPITAL | Age: 67
Discharge: HOME | End: 2022-02-16
Attending: UROLOGY
Payer: MEDICARE

## 2022-02-16 DIAGNOSIS — C61 PROSTATE CANCER: ICD-10-CM

## 2022-02-16 PROCEDURE — A9588 FLUCICLOVINE F-18: HCPCS

## 2022-02-16 PROCEDURE — 78815 NM PET CT FLUCICLOVINE F18(PROSTATE CANCER RECURRENCE): ICD-10-PCS | Mod: 26,PS,, | Performed by: STUDENT IN AN ORGANIZED HEALTH CARE EDUCATION/TRAINING PROGRAM

## 2022-02-16 PROCEDURE — 78815 PET IMAGE W/CT SKULL-THIGH: CPT | Mod: 26,PS,, | Performed by: STUDENT IN AN ORGANIZED HEALTH CARE EDUCATION/TRAINING PROGRAM

## 2022-02-21 NOTE — PROGRESS NOTES
Subjective:      Jm Gilliam Jr. is a 66 y.o. male who returns today regarding his     Prostate cancer.    The following portions of the patient's history were reviewed and updated as appropriate: allergies, current medications, past family history, past medical history, past social history, past surgical history and problem list.    Review of Systems  Pertinent items are noted in HPI.  A comprehensive multipoint review of systems was negative except as otherwise stated in the HPI.     Objective:   Vitals: There were no vitals taken for this visit.    Physical Exam   General: alert and oriented, no acute distress  Respiratory: Symmetric expansion, non-labored breathing  Cardiovascular: normal to inspection  Abdomen: non distended   Skin: normal coloration and turgor, no rashes, no suspicious skin lesions noted  Neuro: no gross deficits  Psych: normal judgment and insight, normal mood/affect and non-anxious    Physical Exam    Lab Review   Urinalysis demonstrates no specimen    Lab Results   Component Value Date    WBC 4.17 09/29/2021    HGB 13.1 (L) 09/29/2021    HCT 40.9 09/29/2021    MCV 91 09/29/2021     09/29/2021     Lab Results   Component Value Date    CREATININE 1.4 11/24/2021    BUN 28 (H) 11/24/2021     Lab Results   Component Value Date    .3 (H) 09/29/2021    PSADIAG 145.4 (H) 11/24/2021     Final Pathologic Diagnosis 1. PROSTATE, LEFT APEX, NEEDLE CORE BIOPSY:   -  Acinar adenocarcinoma, Grade group 5 ([place]t score 4+5=9) in 1 out of 1   core.           Percentage of pattern 4:  50%.           Percentage of pattern 5:  20%.           Percentage of prostatic tissue occupied by tumor:  80%.           Periprostatic fat invasion:  Present.           Perineural invasion:  Present.   -  High-grade prostatic intraepithelial neoplasia (PIN 3).   -  Focal simple glandular atrophy.   -  Focal basal cell hyperplasia.   2. PROSTATE, LEFT MIDDLE, NEEDLE CORE BIOPSY:   -  Acinar adenocarcinoma,  Grade group 5 ([place] score 4+5=9) in 1 out of 1   core.           Percentage of pattern 4:  50%.           Percentage of pattern 5:  30%.           Percentage of prostatic tissue occupied by tumor:  95%.           Periprostatic fat invasion:  Not identified.           Perineural invasion:  Present.   -  Focal high-grade prostatic intraepithelial neoplasia (PIN 3).   -  Focal simple glandular atrophy.   -  Focal basal cell hyperplasia.   3. PROSTATE, LEFT BASE, NEEDLE CORE BIOPSY:   -  Acinar adenocarcinoma, Grade group 5 (Muna score 4+5=9) in 1 out of 1   core.           Percentage of pattern 4:  50%.           Percentage of pattern 5:  20%.           Percentage of prostatic tissue occupied by tumor:  90%.           Periprostatic fat invasion:  Not identified.           Perineural invasion:  Present.   -  Focal high-grade prostatic intraepithelial neoplasia (PIN 3).   -  Focal simple glandular atrophy.   -  Focal basal hyperplasia.   -  Focal chronic prostatitis.   4. PROSTATE, RIGHT APEX, NEEDLE CORE BIOPSY:   -  Acinar adenocarcinoma, Grade group 5 (Muna score 4+5=9) in 1 out of 1   core.           Percentage of pattern 4:  70%.           Percentage of pattern 5:  5%.           Percentage of prostatic tissue occupied by tumor:  80%.           Periprostatic fat invasion:  Not identified.           Perineural invasion:  Present.   -  Focal high-grade prostatic intraepithelial neoplasia (PIN 3).   -  Focal basal cell hyperplasia.   -  Focal chronic prostatitis.   5. PROSTATE RIGHT MIDDLE, NEEDLE CORE BIOPSY:   -  Acinar adenocarcinoma, Grade group 5 (Muna score 4+5=9) in 1 out of 1   core.           Percentage of pattern 4:  60%.           Percentage of pattern 5:  5%.           Percentage of prostatic tissue occupied by tumor:  60%.           Periprostatic fat invasion:  Present.           Perineural invasion:  Present.   -  Focal high-grade prostatic intraepithelial neoplasia (PIN 3).   -  Focal basal  cell hyperplasia.   6. PROSTATE, RIGHT BASE, NEEDLE CORE BIOPSY:   -  Acinar adenocarcinoma, Grade group 5 ([place] score 4+5=9) in 1 out of 1   core.           Percentage of pattern 4:  50%.           Percentage of pattern 5:  20%.           Percentage of prostatic tissue occupied by tumor:  85%.           Periprostatic fat invasion:  Not identified.           Perineural invasion:  Present.   -  High-grade prostatic intraepithelial neoplasia (PIN 3).   COMMENT:  Dr. Jero Urbina has reviewed this case and concurs in the diagnosis.    Comment: Interp By Gina Liu MD, Signed on 01/31/2022 at 15:18         Imaging  NM PET CT FLUCICLOVINE F18(PROSTATE CANCER RECURRENCE)     CLINICAL HISTORY:  psa 123; lea 9; inguinal nodes on CT;  Malignant neoplasm of prostate     TECHNIQUE:  Approximately 3-5 minutes following the intravenous injection of 10.14 mCi F-18 Axumin, PET images were acquired from the proximal thighs to the skull base.  CT images were also acquired for attenuation correction and anatomic localization and performed without oral or IV contrast.     COMPARISON:  CT 12/06/2021, bone scan 11/24/2021     FINDINGS:  Internal reference (mean) SUV regions are as follows:     Abdominal aorta: 1.9     L3 vertebral body: 5.2     Urinary bladder lumen: 0.62     Prominent increased uptake throughout the prostate gland in keeping with known prostate cancer.  Prostate maximum SUV of 10.     Multiple enlarged radiotracer avid right common, external, and internal iliac chain lymph nodes (images 172, 182, 187, 190).  Index right external iliac chain lymph node measures 1.5 cm and demonstrates maximum SUV of 9.1.  Right common iliac chain lymph node cluster measuring up to 1 cm in short axis dimension with maximum SUV of 13.     Increased uptake within distal infrarenal pericaval lymph node measuring 0.7 cm with maximum SUV of 6.3 (image 164).  Increased uptake within a normal sized infrarenal aortocaval lymph node  (image 154).     There is physiologic uptake in the liver, pancreas, and bone marrow.  Physiologic skeletal muscle uptake within the left base of neck.  There is nonspecific uptake in the inguinal lymph nodes.     Impression:     Prominent increased uptake throughout the prostate gland in keeping with known prostate cancer.  Multiple radiotracer right pelvic and retroperitoneal lymph nodes compatible with prostate cancer metastasis.        Electronically signed by: Chi Connolly  Date:                                            02/16/2022  Time:                                           14:34             Exam Ended: 02/16/22 12:44 Last Resulted: 02/16/22 14:34               Assessment and Plan:   Prostate cancer; lea 9 M1 Stage IV (due to distant lymph node involvement)  With involvement of the periaortic and paracaval nodes, prostatectomy does not appear appopriate    See rad onc and heme onc    Elevated PSA
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
## 2022-02-22 ENCOUNTER — OFFICE VISIT (OUTPATIENT)
Dept: UROLOGY | Facility: CLINIC | Age: 67
End: 2022-02-22
Payer: MEDICARE

## 2022-02-22 ENCOUNTER — OFFICE VISIT (OUTPATIENT)
Dept: HEMATOLOGY/ONCOLOGY | Facility: CLINIC | Age: 67
End: 2022-02-22
Payer: MEDICARE

## 2022-02-22 ENCOUNTER — LAB VISIT (OUTPATIENT)
Dept: LAB | Facility: HOSPITAL | Age: 67
End: 2022-02-22
Attending: INTERNAL MEDICINE
Payer: MEDICARE

## 2022-02-22 VITALS
HEIGHT: 69 IN | WEIGHT: 159.38 LBS | BODY MASS INDEX: 23.6 KG/M2 | HEART RATE: 52 BPM | SYSTOLIC BLOOD PRESSURE: 133 MMHG | BODY MASS INDEX: 23.54 KG/M2 | HEIGHT: 69 IN | DIASTOLIC BLOOD PRESSURE: 89 MMHG

## 2022-02-22 DIAGNOSIS — C61 PROSTATE CANCER: Primary | ICD-10-CM

## 2022-02-22 DIAGNOSIS — C61 PROSTATE CANCER: ICD-10-CM

## 2022-02-22 DIAGNOSIS — R97.20 ELEVATED PSA: ICD-10-CM

## 2022-02-22 LAB
COMPLEXED PSA SERPL-MCNC: 278.7 NG/ML (ref 0–4)
TESTOST SERPL-MCNC: 452 NG/DL (ref 304–1227)

## 2022-02-22 PROCEDURE — 99214 OFFICE O/P EST MOD 30 MIN: CPT | Mod: S$PBB,,, | Performed by: UROLOGY

## 2022-02-22 PROCEDURE — 84153 ASSAY OF PSA TOTAL: CPT | Performed by: INTERNAL MEDICINE

## 2022-02-22 PROCEDURE — 99999 PR PBB SHADOW E&M-EST. PATIENT-LVL III: CPT | Mod: PBBFAC,,, | Performed by: UROLOGY

## 2022-02-22 PROCEDURE — 99205 OFFICE O/P NEW HI 60 MIN: CPT | Mod: S$PBB,,, | Performed by: INTERNAL MEDICINE

## 2022-02-22 PROCEDURE — 99214 PR OFFICE/OUTPT VISIT, EST, LEVL IV, 30-39 MIN: ICD-10-PCS | Mod: S$PBB,,, | Performed by: UROLOGY

## 2022-02-22 PROCEDURE — 99213 OFFICE O/P EST LOW 20 MIN: CPT | Mod: 25,PBBFAC | Performed by: UROLOGY

## 2022-02-22 PROCEDURE — 36415 COLL VENOUS BLD VENIPUNCTURE: CPT | Performed by: INTERNAL MEDICINE

## 2022-02-22 PROCEDURE — 99205 PR OFFICE/OUTPT VISIT, NEW, LEVL V, 60-74 MIN: ICD-10-PCS | Mod: S$PBB,,, | Performed by: INTERNAL MEDICINE

## 2022-02-22 PROCEDURE — 99999 PR PBB SHADOW E&M-EST. PATIENT-LVL III: ICD-10-PCS | Mod: PBBFAC,,, | Performed by: UROLOGY

## 2022-02-22 PROCEDURE — 99212 OFFICE O/P EST SF 10 MIN: CPT | Mod: PBBFAC,27 | Performed by: INTERNAL MEDICINE

## 2022-02-22 PROCEDURE — 99999 PR PBB SHADOW E&M-EST. PATIENT-LVL II: ICD-10-PCS | Mod: PBBFAC,,, | Performed by: INTERNAL MEDICINE

## 2022-02-22 PROCEDURE — 99999 PR PBB SHADOW E&M-EST. PATIENT-LVL II: CPT | Mod: PBBFAC,,, | Performed by: INTERNAL MEDICINE

## 2022-02-22 PROCEDURE — 84403 ASSAY OF TOTAL TESTOSTERONE: CPT | Performed by: INTERNAL MEDICINE

## 2022-02-22 RX ORDER — BICALUTAMIDE 50 MG/1
50 TABLET, FILM COATED ORAL DAILY
Qty: 30 TABLET | Refills: 1 | Status: SHIPPED | OUTPATIENT
Start: 2022-02-22 | End: 2022-06-02 | Stop reason: SDUPTHER

## 2022-02-24 ENCOUNTER — TELEPHONE (OUTPATIENT)
Dept: HEMATOLOGY/ONCOLOGY | Facility: CLINIC | Age: 67
End: 2022-02-24
Payer: MEDICARE

## 2022-02-24 NOTE — TELEPHONE ENCOUNTER
Spoke with patient who had questions about information included in his AVS. Discussed with patient why those details play an important role in guiding most appropriate individualized treatment.  Patient verbalized understanding but asked me to discuss concerns with MD.

## 2022-02-24 NOTE — TELEPHONE ENCOUNTER
"----- Message from Crystal Santana sent at 2/24/2022 10:44 AM CST -----         Consult/Advisory:      Name Of Caller:Jm  Contact Preference?:[phone]      Provider Name: Nanette  Does patient feel the need to be seen today?no       What is the nature of the call?:pt states that he was not help with something Dr lopez wrote in his chart and he wants to speak with her regarding it       Additional Notes:  "Thank you for all that you do for our patients'"                           "

## 2022-03-08 ENCOUNTER — OFFICE VISIT (OUTPATIENT)
Dept: RADIATION ONCOLOGY | Facility: CLINIC | Age: 67
End: 2022-03-08
Payer: MEDICARE

## 2022-03-08 VITALS
SYSTOLIC BLOOD PRESSURE: 148 MMHG | DIASTOLIC BLOOD PRESSURE: 101 MMHG | BODY MASS INDEX: 23.7 KG/M2 | RESPIRATION RATE: 19 BRPM | HEIGHT: 69 IN | HEART RATE: 65 BPM | TEMPERATURE: 98 F | OXYGEN SATURATION: 96 % | WEIGHT: 160 LBS

## 2022-03-08 DIAGNOSIS — C61 PROSTATE CANCER: Primary | ICD-10-CM

## 2022-03-08 PROCEDURE — 99213 OFFICE O/P EST LOW 20 MIN: CPT | Mod: PBBFAC | Performed by: STUDENT IN AN ORGANIZED HEALTH CARE EDUCATION/TRAINING PROGRAM

## 2022-03-08 PROCEDURE — 99999 PR PBB SHADOW E&M-EST. PATIENT-LVL III: ICD-10-PCS | Mod: PBBFAC,,, | Performed by: STUDENT IN AN ORGANIZED HEALTH CARE EDUCATION/TRAINING PROGRAM

## 2022-03-08 PROCEDURE — 99999 PR PBB SHADOW E&M-EST. PATIENT-LVL III: CPT | Mod: PBBFAC,,, | Performed by: STUDENT IN AN ORGANIZED HEALTH CARE EDUCATION/TRAINING PROGRAM

## 2022-03-08 PROCEDURE — 99214 OFFICE O/P EST MOD 30 MIN: CPT | Mod: S$PBB,,, | Performed by: STUDENT IN AN ORGANIZED HEALTH CARE EDUCATION/TRAINING PROGRAM

## 2022-03-08 PROCEDURE — 99214 PR OFFICE/OUTPT VISIT, EST, LEVL IV, 30-39 MIN: ICD-10-PCS | Mod: S$PBB,,, | Performed by: STUDENT IN AN ORGANIZED HEALTH CARE EDUCATION/TRAINING PROGRAM

## 2022-03-08 NOTE — PROGRESS NOTES
Radiation Oncology Follow-up Note        Date of Service: 03/08/2022    Chief Complaint: prostate cancer     Reason for visit: consideration for radiation to the pelvis     Referring Physician: Dr Suarez/Reddy (urology)     Therapy to Date:  No radiation    Diagnosis/Assessment:   Jm Gilliam Jr. is a 66 y.o. man with low burden Stage IVB (cT2c, cN1, cM1a, PSA: 145.4, Grade Group: 5) prostate adenocarcinoma, s/p TRUSPB with 66cc gland, 6/6 cores all involved with Grade group 5 ([place] score 4+5=9), negative bone scan , PEYTON with  firm nodules in bilateral lobes, and PET axumin with significant non regional yajaira disease without bone mets.  S/p casodex 02/22/2022 with improvement in urinary function.    PMH of HTN     ECOG 1    Plan     I discussed prostate directed radiation therapy per STAMPEDE trial which showed improvement in OS in patients with low burden metastatic disease.      EBRT would consist of daily radiation treatments M-F, 55Gy in 20 fractions to the prostate gland.     Risks, benefits, and side effects of radiotherapy were discussed.   Side effects include but are not limited to fatigue, erythema, hyperpigmentation, desquamation within the radiation field, more frequent urination, both during the day and at night, urgency with urination, hematuria, dysuria, and a sensation of incomplete emptying.   In addition, the patient will be at risk for increased frequency and/or loose bowel movements.   All of these side effects will go away in the short term.   In the long term, the patient is at risk for radiation cystitis, radiation proctitis, and erectile dysfunction.     - RT consent was signed today after I answered all questions  - Epic-26 survey not filled today  - CT SIM 5/13/2022 (full bladder , empty rectum (enemas)  - Will contact Layla Main to help pt stay at nearby [facility]         HPI:   Jm Gilliam Jr. is a 66 y.o. man with prostate cancer after presenting with elevated  PSA.     Oncologic history  09/29/2021 .3  11/19/2021 Urology (Dr Suarez)              Prostate: nontender; firm nodules in bilateral lobes; size: 50 gm; seminal vesicles not palpated  11/24/2021 .4  11/24/2021 NM bone scan showed no definite evidence of osseous metastasis.     12/06/2021 CT A/P enlarged prostate with nodular contour and multiple dystrophic calcifications, right inguinal 1.2cm  and pelvic 1.1cm lymphadenopathy; multiple bilateral lower abdominal surgical anchors in the abdominal wall, likely from extraperitoneal hernia repair, no bone mets     12/22/2021 colonoscopy tubular adenoma, repeat colonoscopy in 3 years.     01/24/2022 standard TRUSPB (Dr Blakely)              66.4cc gland  6/6 cores all involved with Grade group 5 (Muna score 4+5=9)    02/08/2022 initial [facility] visit: reports feeling very well with some urgency and straining.   Denies BM issues, has 1 BM qAM.   Denies any ED issues.  The patient denies other major complaints or any pain    SILVERIO score 20 (4,4,4,4,4), mild ED  AUA score 22 (0,2,1,5,5,5,4) mostly dissatisfied    Recommended PET Axumin to r/o involvement of inguinal/pelvic nodes or other sites of mets.      Interval history:     2/16/2022 PET axumin: prominent increased uptake throughout the prostate gland SUVm 10.     Multiple enlarged radiotracer avid right common, external, and internal iliac chain lymph nodes (images 172, 182, 187, 190).  Index right external iliac chain lymph node measures 1.5 cm and demonstrates maximum SUV of 9.1.  Right common iliac chain lymph node cluster measuring up to 1 cm in short axis dimension with maximum SUV of 13.  Increased uptake within distal infrarenal pericaval lymph node measuring 0.7 cm with maximum SUV of 6.3 (image 164).  Increased uptake within a normal sized infrarenal aortocaval lymph node (image 154).  Physiologic uptake in the liver, pancreas, and bone marrow.     2/22/2022 .7    2/22/2022 Dr Fitzpatrick:  plan for Lupron and will initiate Casodex first and then switch to Xtandi       Subjective:   In clinic the patient is alone    Overall feeling very well and reports improved urinary function: less blockage and not requiring flomax anymore.  Also reports hot flashes.  Otherwise no significant changes from the last visit.    The patient denies any history of radiation therapy, implantable cardiac devices, or connective tissue disease.    Social history  Lives in [facility] with his wife Angela. Their only child (son)  at age 37 of COVID 19 in 2020. They have grandchildren. He retired from working as  real estate. His wife was a  for [employer]. Denies tobacco or alcohol use.     Family history  no family history of cancers       Current Outpatient Medications on File Prior to Visit   Medication Sig Dispense Refill    amLODIPine (NORVASC) 10 MG tablet Take 1 tablet (10 mg total) by mouth once daily. 30 tablet 11    bicalutamide (CASODEX) 50 MG Tab Take 1 tablet (50 mg total) by mouth once daily. 30 tablet 1    tamsulosin (FLOMAX) 0.4 mg Cap Take 1 capsule (0.4 mg total) by mouth once daily. 90 capsule 3     No current facility-administered medications on file prior to visit.       Review of patient's allergies indicates:   Allergen Reactions    Nyquil liquicaps Swelling         Review of Systems   Constitutional: Positive for unexpected weight change (gain). Negative for fatigue and fever.   HENT: Negative for nasal congestion and tinnitus.    Eyes: Negative for visual disturbance.   Respiratory: Negative for cough, shortness of breath and wheezing.    Cardiovascular: Negative for chest pain, palpitations and leg swelling.   Gastrointestinal: Positive for reflux. Negative for abdominal pain, blood in stool, constipation, diarrhea, nausea and vomiting.   Genitourinary: Positive for frequency. Negative for bladder incontinence, difficulty urinating, dysuria, hematuria and  urgency.   Musculoskeletal: Positive for back pain and neck pain. Negative for arthralgias.   Integumentary:  Negative for rash.   Neurological: Positive for numbness. Negative for dizziness, seizures, speech difficulty, weakness, headaches, disturbances in coordination, memory loss and coordination difficulties.   Psychiatric/Behavioral: Negative for decreased concentration. The patient is not nervous/anxious.          Objective:      Physical Exam  Vitals reviewed.   Constitutional:       Appearance: Normal appearance.   HENT:      Head: Normocephalic and atraumatic.      Mouth/Throat:      Mouth: Mucous membranes are moist.   Eyes:      Conjunctiva/sclera: Conjunctivae normal.   Cardiovascular:      Comments: extremities well perfused  Pulmonary:      Effort: Pulmonary effort is normal.   Abdominal:      General: There is no distension.   Genitourinary:     Comments: PEYTON deferred (cT2c per Dr. Suarez)  Musculoskeletal:         General: Normal range of motion.      Cervical back: Normal range of motion.   Skin:     General: Skin is warm and dry.   Neurological:      General: No focal deficit present.      Mental Status: He is alert and oriented to person, place, and time.   Psychiatric:         Mood and Affect: Mood normal.         Behavior: Behavior normal.       Imaging: I have personally reviewed the patient's available images and reports and summarized pertinent findings above in HPI.        Laboratory: I have personally reviewed the patient's available laboratory values and summarized pertinent findings above in HPI.         I spent approximately 30 minutes reviewing the available records and evaluating the patient, out of which over 50% of the time was spent face to face with the patient in counseling and coordinating this patient's care.     Thank you for the opportunity to care for this patient. Please do not hesitate to contact me with any questions.     Juan King MD/PhD

## 2022-03-21 ENCOUNTER — DOCUMENTATION ONLY (OUTPATIENT)
Dept: HEMATOLOGY/ONCOLOGY | Facility: CLINIC | Age: 67
End: 2022-03-21
Payer: MEDICARE

## 2022-03-21 NOTE — PROGRESS NOTES
Received consult from Navigator Layla Main RN, and Dr. King re: lodging for this patient who will need radiation treatment for prostate cancer. Called patient last Fri. PM and discussed lodging options with he and his wife Angela. [facility], and answered their questions. Patient and his wife would like to stay at the [facility]. Verified patient's demographic information (correct as listed in his chart) and obtained the necessary information to complete the [facility] referral form. He stated that they have received the COVID vaccinations. Informed him that I would complete the referral form and fax it to the [facility], initially requesting a stay for the night before his radiation SIM appt. on 5/13/22. Also informed that the [facility]'s staff will contact him directly. Provided patient with my direct contact number and encouraged for him or his wife to call me as needed.   Completed the referral form this morning and faxed it to the [facility] at [phone]. Spoke with Radha at the [facility], [phone], and she said that the fax was not received, there may be an issue with the line because they got new phones on Fri. She asked if I could send it via email, and I did so. Received a reply email from Domitila Perez who confirmed receipt of the form.  Sent a reply message via Epic to Layla and Dr. King informing them of the arrangements. Will continue to follow and assist as needs are identified.

## 2022-03-23 ENCOUNTER — DOCUMENTATION ONLY (OUTPATIENT)
Dept: HEMATOLOGY/ONCOLOGY | Facility: CLINIC | Age: 67
End: 2022-03-23
Payer: MEDICARE

## 2022-03-25 NOTE — PROGRESS NOTES
"Received reply message via Epic from Dr. King re: patient's tentative start date for his radiation treatments, 5/23 daily M-F (excluding Memorial Day, Mon. 5/30)  x 20 treatments. Completed [facility] referral form requesting a stay from 5/22 - 6/20 and faxed it to [phone]. Called the [facility] at [phone] and spoke with "Colin Torres), who later sent me a confirmation email that the referral form was received. Will continue to follow.     "

## 2022-04-04 ENCOUNTER — TELEPHONE (OUTPATIENT)
Dept: HEMATOLOGY/ONCOLOGY | Facility: CLINIC | Age: 67
End: 2022-04-04
Payer: MEDICARE

## 2022-04-04 NOTE — TELEPHONE ENCOUNTER
----- Message from Onur Rivas NP sent at 3/31/2022  4:19 PM CDT -----  Yes please  ----- Message -----  From: Staci Delgadillo  Sent: 3/31/2022   8:57 AM CDT  To: Heydi Barton RN, Staci Delgadillo, #    Good morning, I am going through a list off CC's that weren't sent. This patient was seen back on 2/22 and needed a lupron 2 weeks after, which was not scheduled.     Should this be given ASAP and then schedule the 8 week after that injection ?    Please advise     ___________________________________________________________________  CC:   Lupron in 2 weeks  PSA and EP in 8 weeks

## 2022-04-06 ENCOUNTER — INFUSION (OUTPATIENT)
Dept: INFUSION THERAPY | Facility: HOSPITAL | Age: 67
End: 2022-04-06
Payer: MEDICARE

## 2022-04-06 DIAGNOSIS — C61 PROSTATE CANCER: Primary | ICD-10-CM

## 2022-04-06 PROCEDURE — 63600175 PHARM REV CODE 636 W HCPCS: Mod: JG | Performed by: INTERNAL MEDICINE

## 2022-04-06 PROCEDURE — 96402 CHEMO HORMON ANTINEOPL SQ/IM: CPT

## 2022-04-06 RX ADMIN — LEUPROLIDE ACETATE 45 MG: KIT at 12:04

## 2022-04-27 ENCOUNTER — IMMUNIZATION (OUTPATIENT)
Dept: PRIMARY CARE CLINIC | Facility: CLINIC | Age: 67
End: 2022-04-27
Payer: MEDICARE

## 2022-04-27 DIAGNOSIS — Z23 NEED FOR VACCINATION: Primary | ICD-10-CM

## 2022-04-27 PROCEDURE — 0064A COVID-19, MRNA, LNP-S, PF, 100 MCG/0.25 ML DOSE VACCINE (MODERNA BOOSTER): CPT | Mod: CV19,PBBFAC | Performed by: INTERNAL MEDICINE

## 2022-04-27 PROCEDURE — 91306 COVID-19, MRNA, LNP-S, PF, 100 MCG/0.25 ML DOSE VACCINE (MODERNA BOOSTER): CPT | Mod: PBBFAC | Performed by: INTERNAL MEDICINE

## 2022-05-13 ENCOUNTER — HOSPITAL ENCOUNTER (OUTPATIENT)
Dept: RADIATION THERAPY | Facility: HOSPITAL | Age: 67
Discharge: HOME | End: 2022-05-13
Attending: STUDENT IN AN ORGANIZED HEALTH CARE EDUCATION/TRAINING PROGRAM
Payer: MEDICARE

## 2022-05-13 PROCEDURE — 77014 HC CT GUIDANCE RADIATION THERAPY FLDS PLACEMENT: CPT | Mod: TC | Performed by: STUDENT IN AN ORGANIZED HEALTH CARE EDUCATION/TRAINING PROGRAM

## 2022-05-13 PROCEDURE — 77334 RADIATION TREATMENT AID(S): CPT | Mod: 26,,, | Performed by: STUDENT IN AN ORGANIZED HEALTH CARE EDUCATION/TRAINING PROGRAM

## 2022-05-13 PROCEDURE — 77334 RADIATION TREATMENT AID(S): CPT | Mod: TC | Performed by: STUDENT IN AN ORGANIZED HEALTH CARE EDUCATION/TRAINING PROGRAM

## 2022-05-13 PROCEDURE — 77263 PR  RADIATION THERAPY PLAN COMPLEX: ICD-10-PCS | Mod: ,,, | Performed by: STUDENT IN AN ORGANIZED HEALTH CARE EDUCATION/TRAINING PROGRAM

## 2022-05-13 PROCEDURE — 77014 PR  CT GUIDANCE PLACEMENT RAD THERAPY FIELDS: CPT | Mod: 26,,, | Performed by: STUDENT IN AN ORGANIZED HEALTH CARE EDUCATION/TRAINING PROGRAM

## 2022-05-13 PROCEDURE — 77263 THER RADIOLOGY TX PLNG CPLX: CPT | Mod: ,,, | Performed by: STUDENT IN AN ORGANIZED HEALTH CARE EDUCATION/TRAINING PROGRAM

## 2022-05-13 PROCEDURE — 77014 PR  CT GUIDANCE PLACEMENT RAD THERAPY FIELDS: ICD-10-PCS | Mod: 26,,, | Performed by: STUDENT IN AN ORGANIZED HEALTH CARE EDUCATION/TRAINING PROGRAM

## 2022-05-13 PROCEDURE — 77334 PR  RADN TREATMENT AID(S) COMPLX: ICD-10-PCS | Mod: 26,,, | Performed by: STUDENT IN AN ORGANIZED HEALTH CARE EDUCATION/TRAINING PROGRAM

## 2022-05-19 PROCEDURE — 77301 RADIOTHERAPY DOSE PLAN IMRT: CPT | Mod: TC | Performed by: STUDENT IN AN ORGANIZED HEALTH CARE EDUCATION/TRAINING PROGRAM

## 2022-05-19 PROCEDURE — 77301 PR  INTEN MOD RADIOTHER PLAN W/DOSE VOL HIST: ICD-10-PCS | Mod: 26,,, | Performed by: STUDENT IN AN ORGANIZED HEALTH CARE EDUCATION/TRAINING PROGRAM

## 2022-05-19 PROCEDURE — 77301 RADIOTHERAPY DOSE PLAN IMRT: CPT | Mod: 26,,, | Performed by: STUDENT IN AN ORGANIZED HEALTH CARE EDUCATION/TRAINING PROGRAM

## 2022-05-20 PROCEDURE — 77300 PR RADIATION THERAPY,DOSIMETRY PLAN: ICD-10-PCS | Mod: 26,,, | Performed by: STUDENT IN AN ORGANIZED HEALTH CARE EDUCATION/TRAINING PROGRAM

## 2022-05-20 PROCEDURE — 77338 PR  MLC IMRT DESIGN & CONSTRUCTION PER IMRT PLAN: ICD-10-PCS | Mod: 26,,, | Performed by: STUDENT IN AN ORGANIZED HEALTH CARE EDUCATION/TRAINING PROGRAM

## 2022-05-20 PROCEDURE — 77300 RADIATION THERAPY DOSE PLAN: CPT | Mod: 26,,, | Performed by: STUDENT IN AN ORGANIZED HEALTH CARE EDUCATION/TRAINING PROGRAM

## 2022-05-20 PROCEDURE — 77300 RADIATION THERAPY DOSE PLAN: CPT | Mod: TC | Performed by: STUDENT IN AN ORGANIZED HEALTH CARE EDUCATION/TRAINING PROGRAM

## 2022-05-20 PROCEDURE — 77338 DESIGN MLC DEVICE FOR IMRT: CPT | Mod: TC | Performed by: STUDENT IN AN ORGANIZED HEALTH CARE EDUCATION/TRAINING PROGRAM

## 2022-05-20 PROCEDURE — 77338 DESIGN MLC DEVICE FOR IMRT: CPT | Mod: 26,,, | Performed by: STUDENT IN AN ORGANIZED HEALTH CARE EDUCATION/TRAINING PROGRAM

## 2022-05-23 ENCOUNTER — DOCUMENTATION ONLY (OUTPATIENT)
Dept: RADIATION THERAPY | Facility: HOSPITAL | Age: 67
End: 2022-05-23
Payer: MEDICARE

## 2022-05-23 PROCEDURE — 77014 HC CT GUIDANCE RADIATION THERAPY FLDS PLACEMENT: CPT | Mod: TC | Performed by: STUDENT IN AN ORGANIZED HEALTH CARE EDUCATION/TRAINING PROGRAM

## 2022-05-23 PROCEDURE — 77385 HC IMRT, SIMPLE: CPT | Performed by: STUDENT IN AN ORGANIZED HEALTH CARE EDUCATION/TRAINING PROGRAM

## 2022-05-23 PROCEDURE — 77014 PR  CT GUIDANCE PLACEMENT RAD THERAPY FIELDS: CPT | Mod: 26,,, | Performed by: STUDENT IN AN ORGANIZED HEALTH CARE EDUCATION/TRAINING PROGRAM

## 2022-05-23 PROCEDURE — 77014 PR  CT GUIDANCE PLACEMENT RAD THERAPY FIELDS: ICD-10-PCS | Mod: 26,,, | Performed by: STUDENT IN AN ORGANIZED HEALTH CARE EDUCATION/TRAINING PROGRAM

## 2022-05-24 PROCEDURE — 77385 HC IMRT, SIMPLE: CPT | Performed by: STUDENT IN AN ORGANIZED HEALTH CARE EDUCATION/TRAINING PROGRAM

## 2022-05-24 PROCEDURE — 77014 PR  CT GUIDANCE PLACEMENT RAD THERAPY FIELDS: ICD-10-PCS | Mod: 26,,, | Performed by: STUDENT IN AN ORGANIZED HEALTH CARE EDUCATION/TRAINING PROGRAM

## 2022-05-24 PROCEDURE — 77014 HC CT GUIDANCE RADIATION THERAPY FLDS PLACEMENT: CPT | Mod: TC | Performed by: STUDENT IN AN ORGANIZED HEALTH CARE EDUCATION/TRAINING PROGRAM

## 2022-05-24 PROCEDURE — 77014 PR  CT GUIDANCE PLACEMENT RAD THERAPY FIELDS: CPT | Mod: 26,,, | Performed by: STUDENT IN AN ORGANIZED HEALTH CARE EDUCATION/TRAINING PROGRAM

## 2022-05-25 PROCEDURE — 77014 HC CT GUIDANCE RADIATION THERAPY FLDS PLACEMENT: CPT | Mod: TC | Performed by: STUDENT IN AN ORGANIZED HEALTH CARE EDUCATION/TRAINING PROGRAM

## 2022-05-25 PROCEDURE — 77014 PR  CT GUIDANCE PLACEMENT RAD THERAPY FIELDS: ICD-10-PCS | Mod: 26,,, | Performed by: STUDENT IN AN ORGANIZED HEALTH CARE EDUCATION/TRAINING PROGRAM

## 2022-05-25 PROCEDURE — 77385 HC IMRT, SIMPLE: CPT | Performed by: STUDENT IN AN ORGANIZED HEALTH CARE EDUCATION/TRAINING PROGRAM

## 2022-05-25 PROCEDURE — 77014 PR  CT GUIDANCE PLACEMENT RAD THERAPY FIELDS: CPT | Mod: 26,,, | Performed by: STUDENT IN AN ORGANIZED HEALTH CARE EDUCATION/TRAINING PROGRAM

## 2022-05-26 PROCEDURE — 77014 HC CT GUIDANCE RADIATION THERAPY FLDS PLACEMENT: CPT | Mod: TC | Performed by: STUDENT IN AN ORGANIZED HEALTH CARE EDUCATION/TRAINING PROGRAM

## 2022-05-26 PROCEDURE — 77014 PR  CT GUIDANCE PLACEMENT RAD THERAPY FIELDS: CPT | Mod: 26,,, | Performed by: STUDENT IN AN ORGANIZED HEALTH CARE EDUCATION/TRAINING PROGRAM

## 2022-05-26 PROCEDURE — 77385 HC IMRT, SIMPLE: CPT | Performed by: STUDENT IN AN ORGANIZED HEALTH CARE EDUCATION/TRAINING PROGRAM

## 2022-05-26 PROCEDURE — 77014 PR  CT GUIDANCE PLACEMENT RAD THERAPY FIELDS: ICD-10-PCS | Mod: 26,,, | Performed by: STUDENT IN AN ORGANIZED HEALTH CARE EDUCATION/TRAINING PROGRAM

## 2022-05-27 ENCOUNTER — DOCUMENTATION ONLY (OUTPATIENT)
Dept: RADIATION ONCOLOGY | Facility: CLINIC | Age: 67
End: 2022-05-27
Payer: MEDICARE

## 2022-05-27 PROCEDURE — 77014 PR  CT GUIDANCE PLACEMENT RAD THERAPY FIELDS: ICD-10-PCS | Mod: 26,,, | Performed by: STUDENT IN AN ORGANIZED HEALTH CARE EDUCATION/TRAINING PROGRAM

## 2022-05-27 PROCEDURE — 77385 HC IMRT, SIMPLE: CPT | Performed by: STUDENT IN AN ORGANIZED HEALTH CARE EDUCATION/TRAINING PROGRAM

## 2022-05-27 PROCEDURE — 77014 PR  CT GUIDANCE PLACEMENT RAD THERAPY FIELDS: CPT | Mod: 26,,, | Performed by: STUDENT IN AN ORGANIZED HEALTH CARE EDUCATION/TRAINING PROGRAM

## 2022-05-27 PROCEDURE — 77014 HC CT GUIDANCE RADIATION THERAPY FLDS PLACEMENT: CPT | Mod: TC | Performed by: STUDENT IN AN ORGANIZED HEALTH CARE EDUCATION/TRAINING PROGRAM

## 2022-05-30 PROCEDURE — 77336 RADIATION PHYSICS CONSULT: CPT | Performed by: STUDENT IN AN ORGANIZED HEALTH CARE EDUCATION/TRAINING PROGRAM

## 2022-05-30 PROCEDURE — 77014 PR  CT GUIDANCE PLACEMENT RAD THERAPY FIELDS: ICD-10-PCS | Mod: 26,,, | Performed by: STUDENT IN AN ORGANIZED HEALTH CARE EDUCATION/TRAINING PROGRAM

## 2022-05-30 PROCEDURE — 77385 HC IMRT, SIMPLE: CPT | Performed by: STUDENT IN AN ORGANIZED HEALTH CARE EDUCATION/TRAINING PROGRAM

## 2022-05-30 PROCEDURE — 77014 PR  CT GUIDANCE PLACEMENT RAD THERAPY FIELDS: CPT | Mod: 26,,, | Performed by: STUDENT IN AN ORGANIZED HEALTH CARE EDUCATION/TRAINING PROGRAM

## 2022-05-30 PROCEDURE — 77014 HC CT GUIDANCE RADIATION THERAPY FLDS PLACEMENT: CPT | Mod: TC | Performed by: STUDENT IN AN ORGANIZED HEALTH CARE EDUCATION/TRAINING PROGRAM

## 2022-05-31 PROCEDURE — 77014 PR  CT GUIDANCE PLACEMENT RAD THERAPY FIELDS: CPT | Mod: 26,,, | Performed by: STUDENT IN AN ORGANIZED HEALTH CARE EDUCATION/TRAINING PROGRAM

## 2022-05-31 PROCEDURE — 77014 HC CT GUIDANCE RADIATION THERAPY FLDS PLACEMENT: CPT | Mod: TC | Performed by: STUDENT IN AN ORGANIZED HEALTH CARE EDUCATION/TRAINING PROGRAM

## 2022-05-31 PROCEDURE — 77014 PR  CT GUIDANCE PLACEMENT RAD THERAPY FIELDS: ICD-10-PCS | Mod: 26,,, | Performed by: STUDENT IN AN ORGANIZED HEALTH CARE EDUCATION/TRAINING PROGRAM

## 2022-05-31 PROCEDURE — 77385 HC IMRT, SIMPLE: CPT | Performed by: STUDENT IN AN ORGANIZED HEALTH CARE EDUCATION/TRAINING PROGRAM

## 2022-06-01 ENCOUNTER — HOSPITAL ENCOUNTER (OUTPATIENT)
Dept: RADIATION THERAPY | Facility: HOSPITAL | Age: 67
Discharge: HOME | End: 2022-06-01
Attending: STUDENT IN AN ORGANIZED HEALTH CARE EDUCATION/TRAINING PROGRAM
Payer: MEDICARE

## 2022-06-01 PROCEDURE — 77014 PR  CT GUIDANCE PLACEMENT RAD THERAPY FIELDS: ICD-10-PCS | Mod: 26,,, | Performed by: STUDENT IN AN ORGANIZED HEALTH CARE EDUCATION/TRAINING PROGRAM

## 2022-06-01 PROCEDURE — 77385 HC IMRT, SIMPLE: CPT | Performed by: STUDENT IN AN ORGANIZED HEALTH CARE EDUCATION/TRAINING PROGRAM

## 2022-06-01 PROCEDURE — 77014 PR  CT GUIDANCE PLACEMENT RAD THERAPY FIELDS: CPT | Mod: 26,,, | Performed by: STUDENT IN AN ORGANIZED HEALTH CARE EDUCATION/TRAINING PROGRAM

## 2022-06-01 PROCEDURE — 77014 HC CT GUIDANCE RADIATION THERAPY FLDS PLACEMENT: CPT | Mod: TC | Performed by: STUDENT IN AN ORGANIZED HEALTH CARE EDUCATION/TRAINING PROGRAM

## 2022-06-02 ENCOUNTER — OFFICE VISIT (OUTPATIENT)
Dept: HEMATOLOGY/ONCOLOGY | Facility: CLINIC | Age: 67
End: 2022-06-02
Payer: MEDICARE

## 2022-06-02 VITALS
WEIGHT: 162.69 LBS | RESPIRATION RATE: 18 BRPM | BODY MASS INDEX: 25.53 KG/M2 | HEIGHT: 67 IN | OXYGEN SATURATION: 98 % | SYSTOLIC BLOOD PRESSURE: 132 MMHG | TEMPERATURE: 98 F | HEART RATE: 54 BPM | DIASTOLIC BLOOD PRESSURE: 86 MMHG

## 2022-06-02 DIAGNOSIS — C61 PROSTATE CANCER: ICD-10-CM

## 2022-06-02 PROCEDURE — 99999 PR PBB SHADOW E&M-EST. PATIENT-LVL III: CPT | Mod: PBBFAC,,, | Performed by: NURSE PRACTITIONER

## 2022-06-02 PROCEDURE — 77014 PR  CT GUIDANCE PLACEMENT RAD THERAPY FIELDS: ICD-10-PCS | Mod: 26,,, | Performed by: STUDENT IN AN ORGANIZED HEALTH CARE EDUCATION/TRAINING PROGRAM

## 2022-06-02 PROCEDURE — 99214 PR OFFICE/OUTPT VISIT, EST, LEVL IV, 30-39 MIN: ICD-10-PCS | Mod: S$PBB,,, | Performed by: NURSE PRACTITIONER

## 2022-06-02 PROCEDURE — 77014 HC CT GUIDANCE RADIATION THERAPY FLDS PLACEMENT: CPT | Mod: TC | Performed by: STUDENT IN AN ORGANIZED HEALTH CARE EDUCATION/TRAINING PROGRAM

## 2022-06-02 PROCEDURE — 77385 HC IMRT, SIMPLE: CPT | Performed by: STUDENT IN AN ORGANIZED HEALTH CARE EDUCATION/TRAINING PROGRAM

## 2022-06-02 PROCEDURE — 77014 PR  CT GUIDANCE PLACEMENT RAD THERAPY FIELDS: CPT | Mod: 26,,, | Performed by: STUDENT IN AN ORGANIZED HEALTH CARE EDUCATION/TRAINING PROGRAM

## 2022-06-02 PROCEDURE — 99999 PR PBB SHADOW E&M-EST. PATIENT-LVL III: ICD-10-PCS | Mod: PBBFAC,,, | Performed by: NURSE PRACTITIONER

## 2022-06-02 PROCEDURE — 99213 OFFICE O/P EST LOW 20 MIN: CPT | Mod: PBBFAC | Performed by: NURSE PRACTITIONER

## 2022-06-02 PROCEDURE — 99214 OFFICE O/P EST MOD 30 MIN: CPT | Mod: S$PBB,,, | Performed by: NURSE PRACTITIONER

## 2022-06-02 RX ORDER — BICALUTAMIDE 50 MG/1
50 TABLET, FILM COATED ORAL DAILY
Qty: 30 TABLET | Refills: 1 | Status: SHIPPED | OUTPATIENT
Start: 2022-06-02 | End: 2022-07-28 | Stop reason: SDUPTHER
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
## 2022-06-03 PROCEDURE — 77014 HC CT GUIDANCE RADIATION THERAPY FLDS PLACEMENT: CPT | Mod: TC | Performed by: STUDENT IN AN ORGANIZED HEALTH CARE EDUCATION/TRAINING PROGRAM

## 2022-06-03 PROCEDURE — 77385 HC IMRT, SIMPLE: CPT | Performed by: STUDENT IN AN ORGANIZED HEALTH CARE EDUCATION/TRAINING PROGRAM

## 2022-06-03 PROCEDURE — 77014 PR  CT GUIDANCE PLACEMENT RAD THERAPY FIELDS: CPT | Mod: 26,,, | Performed by: STUDENT IN AN ORGANIZED HEALTH CARE EDUCATION/TRAINING PROGRAM

## 2022-06-03 PROCEDURE — 77014 PR  CT GUIDANCE PLACEMENT RAD THERAPY FIELDS: ICD-10-PCS | Mod: 26,,, | Performed by: STUDENT IN AN ORGANIZED HEALTH CARE EDUCATION/TRAINING PROGRAM

## 2022-06-06 ENCOUNTER — DOCUMENTATION ONLY (OUTPATIENT)
Dept: RADIATION ONCOLOGY | Facility: CLINIC | Age: 67
End: 2022-06-06
Payer: MEDICARE

## 2022-06-06 PROCEDURE — 77014 PR  CT GUIDANCE PLACEMENT RAD THERAPY FIELDS: ICD-10-PCS | Mod: 26,,, | Performed by: STUDENT IN AN ORGANIZED HEALTH CARE EDUCATION/TRAINING PROGRAM

## 2022-06-06 PROCEDURE — 77014 HC CT GUIDANCE RADIATION THERAPY FLDS PLACEMENT: CPT | Mod: TC | Performed by: STUDENT IN AN ORGANIZED HEALTH CARE EDUCATION/TRAINING PROGRAM

## 2022-06-06 PROCEDURE — 77336 RADIATION PHYSICS CONSULT: CPT | Performed by: STUDENT IN AN ORGANIZED HEALTH CARE EDUCATION/TRAINING PROGRAM

## 2022-06-06 PROCEDURE — 77385 HC IMRT, SIMPLE: CPT | Performed by: STUDENT IN AN ORGANIZED HEALTH CARE EDUCATION/TRAINING PROGRAM

## 2022-06-06 PROCEDURE — 77014 PR  CT GUIDANCE PLACEMENT RAD THERAPY FIELDS: CPT | Mod: 26,,, | Performed by: STUDENT IN AN ORGANIZED HEALTH CARE EDUCATION/TRAINING PROGRAM

## 2022-06-06 NOTE — PLAN OF CARE
Day 11 of outpatient radiation to the prostate. Pt seen by Dr Palma today as pt was not seen 6/3/22. Pt c/o burning with urinating. Denies changes in stool.Nocturia x's 6.

## 2022-06-07 PROCEDURE — 77014 PR  CT GUIDANCE PLACEMENT RAD THERAPY FIELDS: CPT | Mod: 26,,, | Performed by: STUDENT IN AN ORGANIZED HEALTH CARE EDUCATION/TRAINING PROGRAM

## 2022-06-07 PROCEDURE — 77014 HC CT GUIDANCE RADIATION THERAPY FLDS PLACEMENT: CPT | Mod: TC | Performed by: STUDENT IN AN ORGANIZED HEALTH CARE EDUCATION/TRAINING PROGRAM

## 2022-06-07 PROCEDURE — 77385 HC IMRT, SIMPLE: CPT | Performed by: STUDENT IN AN ORGANIZED HEALTH CARE EDUCATION/TRAINING PROGRAM

## 2022-06-07 PROCEDURE — 77014 PR  CT GUIDANCE PLACEMENT RAD THERAPY FIELDS: ICD-10-PCS | Mod: 26,,, | Performed by: STUDENT IN AN ORGANIZED HEALTH CARE EDUCATION/TRAINING PROGRAM

## 2022-06-08 PROCEDURE — 77014 PR  CT GUIDANCE PLACEMENT RAD THERAPY FIELDS: ICD-10-PCS | Mod: 26,,, | Performed by: STUDENT IN AN ORGANIZED HEALTH CARE EDUCATION/TRAINING PROGRAM

## 2022-06-08 PROCEDURE — 77014 HC CT GUIDANCE RADIATION THERAPY FLDS PLACEMENT: CPT | Mod: TC | Performed by: STUDENT IN AN ORGANIZED HEALTH CARE EDUCATION/TRAINING PROGRAM

## 2022-06-08 PROCEDURE — 77385 HC IMRT, SIMPLE: CPT | Performed by: STUDENT IN AN ORGANIZED HEALTH CARE EDUCATION/TRAINING PROGRAM

## 2022-06-08 PROCEDURE — 77014 PR  CT GUIDANCE PLACEMENT RAD THERAPY FIELDS: CPT | Mod: 26,,, | Performed by: STUDENT IN AN ORGANIZED HEALTH CARE EDUCATION/TRAINING PROGRAM

## 2022-06-09 PROCEDURE — 77014 PR  CT GUIDANCE PLACEMENT RAD THERAPY FIELDS: ICD-10-PCS | Mod: 26,,, | Performed by: STUDENT IN AN ORGANIZED HEALTH CARE EDUCATION/TRAINING PROGRAM

## 2022-06-09 PROCEDURE — 77014 HC CT GUIDANCE RADIATION THERAPY FLDS PLACEMENT: CPT | Mod: TC | Performed by: STUDENT IN AN ORGANIZED HEALTH CARE EDUCATION/TRAINING PROGRAM

## 2022-06-09 PROCEDURE — 77385 HC IMRT, SIMPLE: CPT | Performed by: STUDENT IN AN ORGANIZED HEALTH CARE EDUCATION/TRAINING PROGRAM

## 2022-06-09 PROCEDURE — 77014 PR  CT GUIDANCE PLACEMENT RAD THERAPY FIELDS: CPT | Mod: 26,,, | Performed by: STUDENT IN AN ORGANIZED HEALTH CARE EDUCATION/TRAINING PROGRAM

## 2022-06-10 ENCOUNTER — DOCUMENTATION ONLY (OUTPATIENT)
Dept: RADIATION ONCOLOGY | Facility: CLINIC | Age: 67
End: 2022-06-10
Payer: MEDICARE

## 2022-06-10 PROCEDURE — 77014 PR  CT GUIDANCE PLACEMENT RAD THERAPY FIELDS: CPT | Mod: 26,,, | Performed by: STUDENT IN AN ORGANIZED HEALTH CARE EDUCATION/TRAINING PROGRAM

## 2022-06-10 PROCEDURE — 77385 HC IMRT, SIMPLE: CPT | Performed by: STUDENT IN AN ORGANIZED HEALTH CARE EDUCATION/TRAINING PROGRAM

## 2022-06-10 PROCEDURE — 77014 PR  CT GUIDANCE PLACEMENT RAD THERAPY FIELDS: ICD-10-PCS | Mod: 26,,, | Performed by: STUDENT IN AN ORGANIZED HEALTH CARE EDUCATION/TRAINING PROGRAM

## 2022-06-10 PROCEDURE — 77014 HC CT GUIDANCE RADIATION THERAPY FLDS PLACEMENT: CPT | Mod: TC | Performed by: STUDENT IN AN ORGANIZED HEALTH CARE EDUCATION/TRAINING PROGRAM

## 2022-06-10 PROCEDURE — 77336 RADIATION PHYSICS CONSULT: CPT | Performed by: STUDENT IN AN ORGANIZED HEALTH CARE EDUCATION/TRAINING PROGRAM

## 2022-06-10 NOTE — PLAN OF CARE
Day 15 of outpatient radiation to prostate. Doing well. No dysuria or hematuria. Nocturia x 5. Increased soft stools.

## 2022-06-13 PROCEDURE — 77014 PR  CT GUIDANCE PLACEMENT RAD THERAPY FIELDS: CPT | Mod: 26,,, | Performed by: STUDENT IN AN ORGANIZED HEALTH CARE EDUCATION/TRAINING PROGRAM

## 2022-06-13 PROCEDURE — 77014 HC CT GUIDANCE RADIATION THERAPY FLDS PLACEMENT: CPT | Mod: TC | Performed by: STUDENT IN AN ORGANIZED HEALTH CARE EDUCATION/TRAINING PROGRAM

## 2022-06-13 PROCEDURE — 77014 PR  CT GUIDANCE PLACEMENT RAD THERAPY FIELDS: ICD-10-PCS | Mod: 26,,, | Performed by: STUDENT IN AN ORGANIZED HEALTH CARE EDUCATION/TRAINING PROGRAM

## 2022-06-13 PROCEDURE — 77385 HC IMRT, SIMPLE: CPT | Performed by: STUDENT IN AN ORGANIZED HEALTH CARE EDUCATION/TRAINING PROGRAM

## 2022-06-14 PROCEDURE — 77014 PR  CT GUIDANCE PLACEMENT RAD THERAPY FIELDS: ICD-10-PCS | Mod: 26,,, | Performed by: STUDENT IN AN ORGANIZED HEALTH CARE EDUCATION/TRAINING PROGRAM

## 2022-06-14 PROCEDURE — 77014 HC CT GUIDANCE RADIATION THERAPY FLDS PLACEMENT: CPT | Mod: TC | Performed by: STUDENT IN AN ORGANIZED HEALTH CARE EDUCATION/TRAINING PROGRAM

## 2022-06-14 PROCEDURE — 77014 PR  CT GUIDANCE PLACEMENT RAD THERAPY FIELDS: CPT | Mod: 26,,, | Performed by: STUDENT IN AN ORGANIZED HEALTH CARE EDUCATION/TRAINING PROGRAM

## 2022-06-14 PROCEDURE — 77385 HC IMRT, SIMPLE: CPT | Performed by: STUDENT IN AN ORGANIZED HEALTH CARE EDUCATION/TRAINING PROGRAM

## 2022-06-15 PROCEDURE — 77014 PR  CT GUIDANCE PLACEMENT RAD THERAPY FIELDS: CPT | Mod: 26,,, | Performed by: STUDENT IN AN ORGANIZED HEALTH CARE EDUCATION/TRAINING PROGRAM

## 2022-06-15 PROCEDURE — 77014 PR  CT GUIDANCE PLACEMENT RAD THERAPY FIELDS: ICD-10-PCS | Mod: 26,,, | Performed by: STUDENT IN AN ORGANIZED HEALTH CARE EDUCATION/TRAINING PROGRAM

## 2022-06-15 PROCEDURE — 77385 HC IMRT, SIMPLE: CPT | Performed by: STUDENT IN AN ORGANIZED HEALTH CARE EDUCATION/TRAINING PROGRAM

## 2022-06-15 PROCEDURE — 77014 HC CT GUIDANCE RADIATION THERAPY FLDS PLACEMENT: CPT | Mod: TC | Performed by: STUDENT IN AN ORGANIZED HEALTH CARE EDUCATION/TRAINING PROGRAM

## 2022-06-16 PROCEDURE — 77014 PR  CT GUIDANCE PLACEMENT RAD THERAPY FIELDS: CPT | Mod: 26,,, | Performed by: STUDENT IN AN ORGANIZED HEALTH CARE EDUCATION/TRAINING PROGRAM

## 2022-06-16 PROCEDURE — 77014 HC CT GUIDANCE RADIATION THERAPY FLDS PLACEMENT: CPT | Mod: TC | Performed by: STUDENT IN AN ORGANIZED HEALTH CARE EDUCATION/TRAINING PROGRAM

## 2022-06-16 PROCEDURE — 77014 PR  CT GUIDANCE PLACEMENT RAD THERAPY FIELDS: ICD-10-PCS | Mod: 26,,, | Performed by: STUDENT IN AN ORGANIZED HEALTH CARE EDUCATION/TRAINING PROGRAM

## 2022-06-16 PROCEDURE — 77385 HC IMRT, SIMPLE: CPT | Performed by: STUDENT IN AN ORGANIZED HEALTH CARE EDUCATION/TRAINING PROGRAM

## 2022-06-17 ENCOUNTER — DOCUMENTATION ONLY (OUTPATIENT)
Dept: RADIATION ONCOLOGY | Facility: CLINIC | Age: 67
End: 2022-06-17
Payer: MEDICARE

## 2022-06-17 PROCEDURE — 77014 PR  CT GUIDANCE PLACEMENT RAD THERAPY FIELDS: ICD-10-PCS | Mod: 26,,, | Performed by: STUDENT IN AN ORGANIZED HEALTH CARE EDUCATION/TRAINING PROGRAM

## 2022-06-17 PROCEDURE — 77336 RADIATION PHYSICS CONSULT: CPT | Performed by: STUDENT IN AN ORGANIZED HEALTH CARE EDUCATION/TRAINING PROGRAM

## 2022-06-17 PROCEDURE — 77385 HC IMRT, SIMPLE: CPT | Performed by: STUDENT IN AN ORGANIZED HEALTH CARE EDUCATION/TRAINING PROGRAM

## 2022-06-17 PROCEDURE — 77014 PR  CT GUIDANCE PLACEMENT RAD THERAPY FIELDS: CPT | Mod: 26,,, | Performed by: STUDENT IN AN ORGANIZED HEALTH CARE EDUCATION/TRAINING PROGRAM

## 2022-06-17 PROCEDURE — 77014 HC CT GUIDANCE RADIATION THERAPY FLDS PLACEMENT: CPT | Mod: TC | Performed by: STUDENT IN AN ORGANIZED HEALTH CARE EDUCATION/TRAINING PROGRAM

## 2022-06-20 NOTE — PLAN OF CARE
Completed  day 20 of outpatient radiation to prostate. Mild burning with urination. Nocturia x 6. Increased soft stools.

## 2022-07-28 ENCOUNTER — PATIENT MESSAGE (OUTPATIENT)
Dept: RADIATION ONCOLOGY | Facility: CLINIC | Age: 67
End: 2022-07-28
Payer: MEDICARE

## 2022-07-28 DIAGNOSIS — C61 PROSTATE CANCER: Primary | ICD-10-CM

## 2022-07-28 RX ORDER — BICALUTAMIDE 50 MG/1
50 TABLET, FILM COATED ORAL DAILY
Qty: 30 TABLET | Refills: 0 | Status: SHIPPED | OUTPATIENT
Start: 2022-07-28 | End: 2023-11-16

## 2022-07-29 ENCOUNTER — SPECIALTY PHARMACY (OUTPATIENT)
Dept: PHARMACY | Facility: CLINIC | Age: 67
End: 2022-07-29
Payer: MEDICARE

## 2022-07-29 NOTE — TELEPHONE ENCOUNTER
Xtandi - PA submitted via Epic CM. Approved from 07/29/22 through 01/25/2023.   Test Claim: $3,031.18 copay. Pt might benefit from BI/FA. Routed rx to BI team.

## 2022-07-29 NOTE — TELEPHONE ENCOUNTER
Kylee, this is Jayjay Rios with [facility].  We are working on your prescription that your doctor has sent us. We will be working with your insurance to get this approved for you. We will be calling you along the way with updates on your medication.  If you have any questions, you can reach us at [phone]      Welcome call outcome: Patient/caregiver reached

## 2022-07-29 NOTE — TELEPHONE ENCOUNTER
BI: COMPLETE     MEDICARE PLAN: [employer]  Estimated copay: $3,031.81  Benefits Stage: Initial -> Catastrophic  In Network: Yes  PA approval on file: Yes  LIS level: None     Forwarding to FA

## 2022-08-01 NOTE — TELEPHONE ENCOUNTER
Outgoing call to patient regarding Xtandi prescription we received. Informed patient Xtandi has been approved through insurance with a high copay. No grants are currently available at this time but am able to apply patient for  assistance. Patient provided consent, HH size, and annual income and requested to have pt portion sent via email address on file. Sent patient portion of assistance application to email address on file. Will continue to follow up.

## 2022-08-01 NOTE — TELEPHONE ENCOUNTER
Sent a staff message to [employer] regarding [employer] assistance application and faxed application prescription to [phone] for provider review and signature. Will continue to follow up.

## 2022-08-03 NOTE — TELEPHONE ENCOUNTER
Received patient portion of assistance application and submission is pending provider portion. Will continue to follow up.

## 2022-08-04 NOTE — TELEPHONE ENCOUNTER
Submission pending provider portion. Sent staff message to [employer] to check on the status of the providers portion of assistance application - faxed to [employer] at [phone] on 8/1/22 for provider review and signature. Will continue to follow up.

## 2022-08-05 NOTE — TELEPHONE ENCOUNTER
Received provider portion of assistance application and faxed completed assistance application to  for processing and review. Will continue to follow up until final determination is made.

## 2022-08-09 NOTE — TELEPHONE ENCOUNTER
Received update from [employer] confirming patients application for Xtandi has been received, however, the provider portion is missing date signed. Refaxed updated provider prescription form to [employer] for processing and review. Will continue to follow up until final determination is made.

## 2022-08-11 NOTE — TELEPHONE ENCOUNTER
Patient is approved for [employer] Patient Assistance Program as of 8/10/22 through 12/31/22 and will receive Xtandi free of charge through the programs dispensing [facility]. Someone from the program's pharmacy will be reaching out to patient to coordinate their first shipment. Patient can also contact [employer] Patient Assistance Program at [phone] to check on the status of their prescription and schedule shipment for medication.        FOR DOCUMENTATION ONLY:  Financial Assistance for Xtandi is approved from 8/10/22 to 12/31/22  Source: [employer] Patient Assistance Program  Case ID: [id number]   Phone: [phone]  Fax: [phone]   Pharmacy: [facility]

## 2022-08-11 NOTE — TELEPHONE ENCOUNTER
No answer, LVM. Outgoing call attempt to patient regarding Xtandi PAP approval. Would like to notify patient that he has been approved for Xtandi PAP and provide him with program phone number so that he can schedule a shipment of his medication. See note below.

## 2022-08-12 NOTE — TELEPHONE ENCOUNTER
Incoming call regarding return call to FA. Informed pt of updated information on His PAP and gave pt the Pharmacy information to call to set up his delivery from PAP.

## 2022-08-26 ENCOUNTER — LAB VISIT (OUTPATIENT)
Dept: LAB | Facility: HOSPITAL | Age: 67
End: 2022-08-26
Attending: NURSE PRACTITIONER
Payer: MEDICARE

## 2022-08-26 DIAGNOSIS — C61 PROSTATE CANCER: ICD-10-CM

## 2022-08-26 LAB
ALBUMIN SERPL BCP-MCNC: 4.1 G/DL (ref 3.5–5.2)
ALP SERPL-CCNC: 60 U/L (ref 55–135)
ALT SERPL W/O P-5'-P-CCNC: 14 U/L (ref 10–44)
ANION GAP SERPL CALC-SCNC: 10 MMOL/L (ref 8–16)
AST SERPL-CCNC: 16 U/L (ref 10–40)
BILIRUB SERPL-MCNC: 0.3 MG/DL (ref 0.1–1)
BUN SERPL-MCNC: 23 MG/DL (ref 8–23)
CALCIUM SERPL-MCNC: 9.9 MG/DL (ref 8.7–10.5)
CHLORIDE SERPL-SCNC: 105 MMOL/L (ref 95–110)
CO2 SERPL-SCNC: 27 MMOL/L (ref 23–29)
COMPLEXED PSA SERPL-MCNC: 0.77 NG/ML (ref 0–4)
CREAT SERPL-MCNC: 1.5 MG/DL (ref 0.5–1.4)
ERYTHROCYTE [DISTWIDTH] IN BLOOD BY AUTOMATED COUNT: 13.4 % (ref 11.5–14.5)
EST. GFR  (NO RACE VARIABLE): 50 ML/MIN/1.73 M^2
GLUCOSE SERPL-MCNC: 82 MG/DL (ref 70–110)
HCT VFR BLD AUTO: 34.8 % (ref 40–54)
HGB BLD-MCNC: 11.4 G/DL (ref 14–18)
IMM GRANULOCYTES # BLD AUTO: 0 K/UL (ref 0–0.04)
MCH RBC QN AUTO: 29.9 PG (ref 27–31)
MCHC RBC AUTO-ENTMCNC: 32.8 G/DL (ref 32–36)
MCV RBC AUTO: 91 FL (ref 82–98)
NEUTROPHILS # BLD AUTO: 1.7 K/UL (ref 1.8–7.7)
PLATELET # BLD AUTO: 269 K/UL (ref 150–450)
PMV BLD AUTO: 10.3 FL (ref 9.2–12.9)
POTASSIUM SERPL-SCNC: 4.6 MMOL/L (ref 3.5–5.1)
PROT SERPL-MCNC: 8 G/DL (ref 6–8.4)
RBC # BLD AUTO: 3.81 M/UL (ref 4.6–6.2)
SODIUM SERPL-SCNC: 142 MMOL/L (ref 136–145)
WBC # BLD AUTO: 3.69 K/UL (ref 3.9–12.7)

## 2022-08-26 PROCEDURE — 36415 COLL VENOUS BLD VENIPUNCTURE: CPT | Performed by: NURSE PRACTITIONER

## 2022-08-26 PROCEDURE — 80053 COMPREHEN METABOLIC PANEL: CPT | Performed by: NURSE PRACTITIONER

## 2022-08-26 PROCEDURE — 84153 ASSAY OF PSA TOTAL: CPT | Performed by: NURSE PRACTITIONER

## 2022-08-26 PROCEDURE — 85027 COMPLETE CBC AUTOMATED: CPT | Performed by: NURSE PRACTITIONER

## 2022-08-30 ENCOUNTER — OFFICE VISIT (OUTPATIENT)
Dept: HEMATOLOGY/ONCOLOGY | Facility: CLINIC | Age: 67
End: 2022-08-30
Payer: MEDICARE

## 2022-08-30 ENCOUNTER — OFFICE VISIT (OUTPATIENT)
Dept: RADIATION ONCOLOGY | Facility: CLINIC | Age: 67
End: 2022-08-30
Payer: MEDICARE

## 2022-08-30 VITALS
HEART RATE: 65 BPM | HEIGHT: 67 IN | DIASTOLIC BLOOD PRESSURE: 91 MMHG | RESPIRATION RATE: 20 BRPM | HEIGHT: 67 IN | DIASTOLIC BLOOD PRESSURE: 91 MMHG | RESPIRATION RATE: 20 BRPM | SYSTOLIC BLOOD PRESSURE: 133 MMHG | HEART RATE: 65 BPM | OXYGEN SATURATION: 98 % | BODY MASS INDEX: 25.66 KG/M2 | WEIGHT: 163.5 LBS | TEMPERATURE: 98 F | WEIGHT: 163.5 LBS | SYSTOLIC BLOOD PRESSURE: 133 MMHG | BODY MASS INDEX: 25.66 KG/M2 | OXYGEN SATURATION: 97 %

## 2022-08-30 DIAGNOSIS — C61 PROSTATE CANCER: Primary | ICD-10-CM

## 2022-08-30 DIAGNOSIS — M81.6 LOCALIZED OSTEOPOROSIS (LEQUESNE): ICD-10-CM

## 2022-08-30 PROCEDURE — 99213 PR OFFICE/OUTPT VISIT, EST, LEVL III, 20-29 MIN: ICD-10-PCS | Mod: S$PBB,,, | Performed by: INTERNAL MEDICINE

## 2022-08-30 PROCEDURE — 99214 OFFICE O/P EST MOD 30 MIN: CPT | Mod: S$PBB,,, | Performed by: STUDENT IN AN ORGANIZED HEALTH CARE EDUCATION/TRAINING PROGRAM

## 2022-08-30 PROCEDURE — 99999 PR PBB SHADOW E&M-EST. PATIENT-LVL III: CPT | Mod: PBBFAC,,, | Performed by: STUDENT IN AN ORGANIZED HEALTH CARE EDUCATION/TRAINING PROGRAM

## 2022-08-30 PROCEDURE — 99213 OFFICE O/P EST LOW 20 MIN: CPT | Mod: PBBFAC | Performed by: INTERNAL MEDICINE

## 2022-08-30 PROCEDURE — 99999 PR PBB SHADOW E&M-EST. PATIENT-LVL III: CPT | Mod: PBBFAC,,, | Performed by: INTERNAL MEDICINE

## 2022-08-30 PROCEDURE — 99213 OFFICE O/P EST LOW 20 MIN: CPT | Mod: S$PBB,,, | Performed by: INTERNAL MEDICINE

## 2022-08-30 PROCEDURE — 99999 PR PBB SHADOW E&M-EST. PATIENT-LVL III: ICD-10-PCS | Mod: PBBFAC,,, | Performed by: INTERNAL MEDICINE

## 2022-08-30 PROCEDURE — 99214 PR OFFICE/OUTPT VISIT, EST, LEVL IV, 30-39 MIN: ICD-10-PCS | Mod: S$PBB,,, | Performed by: STUDENT IN AN ORGANIZED HEALTH CARE EDUCATION/TRAINING PROGRAM

## 2022-08-30 PROCEDURE — 99213 OFFICE O/P EST LOW 20 MIN: CPT | Mod: PBBFAC,27 | Performed by: STUDENT IN AN ORGANIZED HEALTH CARE EDUCATION/TRAINING PROGRAM

## 2022-08-30 PROCEDURE — 99999 PR PBB SHADOW E&M-EST. PATIENT-LVL III: ICD-10-PCS | Mod: PBBFAC,,, | Performed by: STUDENT IN AN ORGANIZED HEALTH CARE EDUCATION/TRAINING PROGRAM

## 2022-08-30 NOTE — PROGRESS NOTES
Radiation Oncology Follow-up Note        Date of Service: 08/30/2022     Chief Complaint: metastatic prostate cancer     Reason for visit: post RT toxicity check and PSA surveillance     Referring Physician: Dr Suarez/Reddy (urology)     Therapy to Date:  Course: C1 PELVIS 2022    Treatment Site Ref. ID Energy Dose/Fx (Gy) #Fx Dose Correction (Gy) Total Dose (Gy) Start Date End Date Elapsed Days   IM Prostate PTV_High 6X 2.75 20 / 20 0 55 5/23/2022 6/17/2022 25        Diagnosis/Assessment:   Jm Gilliam Jr. is a 66 y.o. man with low burden Stage IVB (cT2c, cN1, cM1a, PSA: 145.4, Grade Group: 5) prostate adenocarcinoma, s/p TRUSPB with 66cc gland, 6/6 cores all involved with GG5 (GS 4+5=9), negative bone scan , PEYTON with firm nodules in bilateral lobes, and PET axumin with significant non regional yajaira disease without bone mets.    PMH of HTN    S/p casodex 02/22/2022 with improvement in urinary function, on Lupron and recently started Xtandi (mid august 2022-)  S/p prostate directed radiation therapy 55Gy/20fx per STAMPEDE completed 6/17/2022     ECOG 1, doing well today with improvement in urinary function (less straining, polyuria and nocturia)     Plan      Patient would like to continue followup with [facility] although I explained it is most important to follow-up with Dr Fitzpatrick who is prescribing Lupron, xtandi and carrying out PSA surveillance.    - We will see patient in about 6 months at [facility] along with Dr Fitzpatrick.     Interval history     2/22/2022 .7    6/2/2022 PSA 10.4    6/17/2022 Tolerated radiation therapy well with G2 dysuria; without treatment delays or breaks.  c/w ibuprofen BID to daily  PSA in 3 months (already placed Dr Fitzpatrick) with followup    7/29/2022  Xtandi - PA submitted via Epic [facility]. Approved from 07/29/22 through 01/25/2023 8/26/2022 PSA 0.77      Subjective   He is accompanied by his wife Angela.  Overall he is very happy about his great PSA response and the fact  that his urinary function has noticeably improved (less straining, polyuria and nocturia).  He denies major BM issues.  In great spirits overall.  He has significant ED but stated it does not bother him, the most important is for him to live long to enjoy his 12yo grandson.  He denies major issues.    Review of patient's allergies indicates:   Allergen Reactions    Nyquil liquicaps Swelling       Current Outpatient Medications on File Prior to Visit   Medication Sig Dispense Refill    amLODIPine (NORVASC) 10 MG tablet Take 1 tablet (10 mg total) by mouth once daily. 30 tablet 11    enzalutamide (XTANDI) 40 mg Cap Take 160 mg by mouth once daily. 120 capsule 3    tamsulosin (FLOMAX) 0.4 mg Cap Take 1 capsule (0.4 mg total) by mouth once daily. 90 capsule 3    bicalutamide (CASODEX) 50 MG Tab Take 1 tablet (50 mg total) by mouth once daily. 30 tablet 0     No current facility-administered medications on file prior to visit.     Review of Systems   Negative unless as above    HPI:   Jm Gilliam Jr. is a 66 y.o. man with prostate cancer after presenting with elevated PSA.     Oncologic history  09/29/2021 .3  11/19/2021 Urology (Dr Suarez)              Prostate: nontender; firm nodules in bilateral lobes; size: 50 gm; seminal vesicles not palpated  11/24/2021 .4  11/24/2021 NM bone scan showed no definite evidence of osseous metastasis.     12/06/2021 CT A/P enlarged prostate with nodular contour and multiple dystrophic calcifications, right inguinal 1.2cm  and pelvic 1.1cm lymphadenopathy; multiple bilateral lower abdominal surgical anchors in the abdominal wall, likely from extraperitoneal hernia repair, no bone mets     12/22/2021 colonoscopy tubular adenoma, repeat colonoscopy in 3 years.     01/24/2022 standard TRUSPB (Dr Blakely)              66.4cc gland  6/6 cores all involved with Grade group 5 (Muna score 4+5=9)     02/08/2022 initial [facility] visit: reports feeling very well with some  urgency and straining.   Denies BM issues, has 1 BM qAM.   Denies any ED issues.  The patient denies other major complaints or any pain  SILVERIO score 20 (4,4,4,4,4), mild ED  AUA score 22 (0,2,1,5,5,5,4) mostly dissatisfied  Recommended PET Axumin to r/o involvement of inguinal/pelvic nodes or other sites of mets.       2/16/2022 PET axumin: prominent increased uptake throughout the prostate gland SUVm 10.     Multiple enlarged radiotracer avid right common, external, and internal iliac chain lymph nodes (images 172, 182, 187, 190).  Index right external iliac chain lymph node measures 1.5 cm and demonstrates maximum SUV of 9.1.  Right common iliac chain lymph node cluster measuring up to 1 cm in short axis dimension with maximum SUV of 13.  Increased uptake within distal infrarenal pericaval lymph node measuring 0.7 cm with maximum SUV of 6.3 (image 164).  Increased uptake within a normal sized infrarenal aortocaval lymph node (image 154).  Physiologic uptake in the liver, pancreas, and bone marrow.     2/22/2022 .7     2/22/2022 Dr Fitzpatrick: plan for Lupron and will initiate Casodex first and then switch to Xtandi           Objective:   Physical Exam  Vitals reviewed.   Constitutional:       Appearance: Normal appearance.   HENT:      Head: Normocephalic and atraumatic.      Mouth/Throat:      Mouth: Mucous membranes are moist.   Eyes:      Conjunctiva/sclera: Conjunctivae normal.   Cardiovascular:      Comments: extremities well perfused  Pulmonary:      Effort: Pulmonary effort is normal.   Abdominal:      General: There is no distension.   Genitourinary:     Comments: PEYTON deferred (cT2c per Dr. Suarez), s/p prostate directed RT  Musculoskeletal:         General: Normal range of motion.      Cervical back: Normal range of motion.   Skin:     General: Skin is warm and dry.   Neurological:      General: No focal deficit present.      Mental Status: He is alert and oriented to person, place, and time.    Psychiatric:         Mood and Affect: Mood normal.         Behavior: Behavior normal.         Laboratory: I have personally reviewed the patient's available laboratory values and summarized pertinent findings above in HPI.         I spent approximately 30 minutes reviewing the available records and evaluating the patient, out of which over 50% of the time was spent face to face with the patient in counseling and coordinating this patient's care.     Thank you for the opportunity to care for this patient. Please do not hesitate to contact me with any questions.     Juan King MD/PhD
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
## 2022-10-24 ENCOUNTER — TELEPHONE (OUTPATIENT)
Dept: HEMATOLOGY/ONCOLOGY | Facility: CLINIC | Age: 67
End: 2022-10-24
Payer: MEDICARE

## 2022-10-25 ENCOUNTER — INFUSION (OUTPATIENT)
Dept: INFUSION THERAPY | Facility: HOSPITAL | Age: 67
End: 2022-10-25
Payer: MEDICARE

## 2022-10-25 ENCOUNTER — OFFICE VISIT (OUTPATIENT)
Dept: HEMATOLOGY/ONCOLOGY | Facility: CLINIC | Age: 67
End: 2022-10-25
Payer: MEDICARE

## 2022-10-25 ENCOUNTER — HOSPITAL ENCOUNTER (OUTPATIENT)
Dept: RADIOLOGY | Facility: CLINIC | Age: 67
Discharge: HOME | End: 2022-10-25
Attending: INTERNAL MEDICINE
Payer: MEDICARE

## 2022-10-25 VITALS
WEIGHT: 165.38 LBS | HEIGHT: 67 IN | RESPIRATION RATE: 18 BRPM | TEMPERATURE: 99 F | BODY MASS INDEX: 25.96 KG/M2 | DIASTOLIC BLOOD PRESSURE: 84 MMHG | OXYGEN SATURATION: 99 % | SYSTOLIC BLOOD PRESSURE: 165 MMHG | HEART RATE: 57 BPM

## 2022-10-25 DIAGNOSIS — C61 PROSTATE CANCER: Primary | ICD-10-CM

## 2022-10-25 DIAGNOSIS — M81.6 LOCALIZED OSTEOPOROSIS (LEQUESNE): ICD-10-CM

## 2022-10-25 DIAGNOSIS — R11.2 NAUSEA AND VOMITING, UNSPECIFIED VOMITING TYPE: ICD-10-CM

## 2022-10-25 DIAGNOSIS — C61 PROSTATE CANCER: ICD-10-CM

## 2022-10-25 DIAGNOSIS — I10 PRIMARY HYPERTENSION: ICD-10-CM

## 2022-10-25 PROCEDURE — 99999 PR PBB SHADOW E&M-EST. PATIENT-LVL III: ICD-10-PCS | Mod: PBBFAC,,, | Performed by: REGISTERED NURSE

## 2022-10-25 PROCEDURE — 77080 DEXA BONE DENSITY SPINE HIP: ICD-10-PCS | Mod: 26,,, | Performed by: INTERNAL MEDICINE

## 2022-10-25 PROCEDURE — 63600175 PHARM REV CODE 636 W HCPCS: Mod: JG | Performed by: INTERNAL MEDICINE

## 2022-10-25 PROCEDURE — 96402 CHEMO HORMON ANTINEOPL SQ/IM: CPT

## 2022-10-25 PROCEDURE — 99215 OFFICE O/P EST HI 40 MIN: CPT | Mod: S$PBB,,, | Performed by: REGISTERED NURSE

## 2022-10-25 PROCEDURE — 77080 DXA BONE DENSITY AXIAL: CPT | Mod: TC

## 2022-10-25 PROCEDURE — 99999 PR PBB SHADOW E&M-EST. PATIENT-LVL III: CPT | Mod: PBBFAC,,, | Performed by: REGISTERED NURSE

## 2022-10-25 PROCEDURE — 99213 OFFICE O/P EST LOW 20 MIN: CPT | Mod: PBBFAC,25 | Performed by: REGISTERED NURSE

## 2022-10-25 PROCEDURE — 99215 PR OFFICE/OUTPT VISIT, EST, LEVL V, 40-54 MIN: ICD-10-PCS | Mod: S$PBB,,, | Performed by: REGISTERED NURSE

## 2022-10-25 PROCEDURE — 77080 DXA BONE DENSITY AXIAL: CPT | Mod: 26,,, | Performed by: INTERNAL MEDICINE

## 2022-10-25 RX ORDER — TAMSULOSIN HYDROCHLORIDE 0.4 MG/1
0.4 CAPSULE ORAL DAILY
Qty: 90 CAPSULE | Refills: 3 | Status: SHIPPED | OUTPATIENT
Start: 2022-10-25 | End: 2023-10-12

## 2022-10-25 RX ORDER — ONDANSETRON 8 MG/1
8 TABLET, ORALLY DISINTEGRATING ORAL EVERY 12 HOURS PRN
Qty: 30 TABLET | Refills: 1 | Status: SHIPPED | OUTPATIENT
Start: 2022-10-25 | End: 2023-10-25

## 2022-10-25 RX ADMIN — LEUPROLIDE ACETATE 45 MG: KIT at 04:10
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
## 2022-11-11 ENCOUNTER — IMMUNIZATION (OUTPATIENT)
Dept: INTERNAL MEDICINE | Facility: CLINIC | Age: 67
End: 2022-11-11
Payer: MEDICARE

## 2022-11-11 PROCEDURE — G0008 ADMIN INFLUENZA VIRUS VAC: HCPCS | Mod: PBBFAC

## 2022-11-18 ENCOUNTER — IMMUNIZATION (OUTPATIENT)
Dept: PHARMACY | Facility: CLINIC | Age: 67
End: 2022-11-18
Payer: MEDICARE

## 2022-11-18 DIAGNOSIS — Z23 NEED FOR VACCINATION: Primary | ICD-10-CM

## 2022-11-25 ENCOUNTER — SPECIALTY PHARMACY (OUTPATIENT)
Dept: PHARMACY | Facility: CLINIC | Age: 67
End: 2022-11-25
Payer: MEDICARE

## 2022-11-28 NOTE — TELEPHONE ENCOUNTER
Re enrollment form requires provider authorization only. Patient authorization on file is good for 3 years from original enrollment date.    Sent a staff message to MALINA regarding Xtandi assistance renewal application and faxed renewal application prescription to [phone] for provider review and signature.

## 2022-12-01 NOTE — TELEPHONE ENCOUNTER
Received provider portion of assistance renewal application and submitted completed application to  for processing and review. Will continue to follow up until final determination is made.

## 2022-12-19 ENCOUNTER — TELEPHONE (OUTPATIENT)
Dept: HEMATOLOGY/ONCOLOGY | Facility: CLINIC | Age: 67
End: 2022-12-19
Payer: MEDICARE

## 2022-12-19 DIAGNOSIS — C61 PROSTATE CANCER: Primary | ICD-10-CM

## 2022-12-19 NOTE — TELEPHONE ENCOUNTER
----- Message from Lizzeth Dale sent at 12/19/2022  3:56 PM CST -----  Name Of Caller:Jm            Provider Name:Nicol Alpine            Does patient feel the need to be seen today?NO            Relationship to the Pt?:Pt            Contact Preference?:[phone]            What is the nature of the call?: Pt would like to reschedule his appt for tomorrow due to bad weather.Next available date is 2/14/2023, He would like come in sooner.

## 2023-01-06 ENCOUNTER — LAB VISIT (OUTPATIENT)
Dept: LAB | Facility: HOSPITAL | Age: 68
End: 2023-01-06
Attending: INTERNAL MEDICINE
Payer: MEDICARE

## 2023-01-06 DIAGNOSIS — C61 PROSTATE CANCER: ICD-10-CM

## 2023-01-06 LAB
ALBUMIN SERPL BCP-MCNC: 3.7 G/DL (ref 3.5–5.2)
ALP SERPL-CCNC: 80 U/L (ref 55–135)
ALT SERPL W/O P-5'-P-CCNC: 7 U/L (ref 10–44)
ANION GAP SERPL CALC-SCNC: 6 MMOL/L (ref 8–16)
AST SERPL-CCNC: 13 U/L (ref 10–40)
BASOPHILS # BLD AUTO: 0.02 K/UL (ref 0–0.2)
BASOPHILS NFR BLD: 0.5 % (ref 0–1.9)
BILIRUB SERPL-MCNC: 0.3 MG/DL (ref 0.1–1)
BUN SERPL-MCNC: 18 MG/DL (ref 8–23)
CALCIUM SERPL-MCNC: 10.1 MG/DL (ref 8.7–10.5)
CHLORIDE SERPL-SCNC: 107 MMOL/L (ref 95–110)
CO2 SERPL-SCNC: 27 MMOL/L (ref 23–29)
COMPLEXED PSA SERPL-MCNC: 0.16 NG/ML (ref 0–4)
CREAT SERPL-MCNC: 1.2 MG/DL (ref 0.5–1.4)
DIFFERENTIAL METHOD: ABNORMAL
EOSINOPHIL # BLD AUTO: 0.1 K/UL (ref 0–0.5)
EOSINOPHIL NFR BLD: 2.3 % (ref 0–8)
ERYTHROCYTE [DISTWIDTH] IN BLOOD BY AUTOMATED COUNT: 13.1 % (ref 11.5–14.5)
EST. GFR  (NO RACE VARIABLE): >60 ML/MIN/1.73 M^2
GLUCOSE SERPL-MCNC: 93 MG/DL (ref 70–110)
HCT VFR BLD AUTO: 36.7 % (ref 40–54)
HGB BLD-MCNC: 12.1 G/DL (ref 14–18)
IMM GRANULOCYTES # BLD AUTO: 0.01 K/UL (ref 0–0.04)
IMM GRANULOCYTES NFR BLD AUTO: 0.3 % (ref 0–0.5)
LYMPHOCYTES # BLD AUTO: 1.5 K/UL (ref 1–4.8)
LYMPHOCYTES NFR BLD: 36.9 % (ref 18–48)
MCH RBC QN AUTO: 29.3 PG (ref 27–31)
MCHC RBC AUTO-ENTMCNC: 33 G/DL (ref 32–36)
MCV RBC AUTO: 89 FL (ref 82–98)
MONOCYTES # BLD AUTO: 0.5 K/UL (ref 0.3–1)
MONOCYTES NFR BLD: 12.1 % (ref 4–15)
NEUTROPHILS # BLD AUTO: 1.9 K/UL (ref 1.8–7.7)
NEUTROPHILS NFR BLD: 47.9 % (ref 38–73)
NRBC BLD-RTO: 0 /100 WBC
PLATELET # BLD AUTO: 295 K/UL (ref 150–450)
PMV BLD AUTO: 10.5 FL (ref 9.2–12.9)
POTASSIUM SERPL-SCNC: 4.3 MMOL/L (ref 3.5–5.1)
PROT SERPL-MCNC: 7.6 G/DL (ref 6–8.4)
RBC # BLD AUTO: 4.13 M/UL (ref 4.6–6.2)
SODIUM SERPL-SCNC: 140 MMOL/L (ref 136–145)
WBC # BLD AUTO: 3.98 K/UL (ref 3.9–12.7)

## 2023-01-06 PROCEDURE — 84153 ASSAY OF PSA TOTAL: CPT | Performed by: INTERNAL MEDICINE

## 2023-01-06 PROCEDURE — 80053 COMPREHEN METABOLIC PANEL: CPT | Performed by: INTERNAL MEDICINE

## 2023-01-06 PROCEDURE — 85025 COMPLETE CBC W/AUTO DIFF WBC: CPT | Performed by: INTERNAL MEDICINE

## 2023-01-06 PROCEDURE — 36415 COLL VENOUS BLD VENIPUNCTURE: CPT | Performed by: INTERNAL MEDICINE

## 2023-01-25 NOTE — TELEPHONE ENCOUNTER
Patients re-enrollment is approved for [employer] Patient Assistance Program for Xtandi, effective 1/25/23 through 12/31/23. Patient will continue to receive Xtandi free of charge through the programs dispensing pharmacy, [facility].         FOR DOCUMENTATION ONLY:  Financial Assistance for Xtandi is approved from 1/25/23 to 12/31/23  Source: [employer] Patient Assistance Program  Case ID: [id number]  Phone: [phone]  Fax: [phone]    Pharmacy: [facility]

## 2023-02-07 ENCOUNTER — OFFICE VISIT (OUTPATIENT)
Dept: HEMATOLOGY/ONCOLOGY | Facility: CLINIC | Age: 68
End: 2023-02-07
Payer: MEDICARE

## 2023-02-07 VITALS
DIASTOLIC BLOOD PRESSURE: 84 MMHG | OXYGEN SATURATION: 99 % | HEART RATE: 71 BPM | BODY MASS INDEX: 26.62 KG/M2 | RESPIRATION RATE: 17 BRPM | SYSTOLIC BLOOD PRESSURE: 125 MMHG | TEMPERATURE: 97 F | HEIGHT: 67 IN | WEIGHT: 169.63 LBS

## 2023-02-07 DIAGNOSIS — C61 PROSTATE CANCER: Primary | ICD-10-CM

## 2023-02-07 PROCEDURE — 99213 OFFICE O/P EST LOW 20 MIN: CPT | Mod: PBBFAC | Performed by: INTERNAL MEDICINE

## 2023-02-07 PROCEDURE — 99214 PR OFFICE/OUTPT VISIT, EST, LEVL IV, 30-39 MIN: ICD-10-PCS | Mod: S$PBB,,, | Performed by: INTERNAL MEDICINE

## 2023-02-07 PROCEDURE — 99999 PR PBB SHADOW E&M-EST. PATIENT-LVL III: ICD-10-PCS | Mod: PBBFAC,,, | Performed by: INTERNAL MEDICINE

## 2023-02-07 PROCEDURE — 99999 PR PBB SHADOW E&M-EST. PATIENT-LVL III: CPT | Mod: PBBFAC,,, | Performed by: INTERNAL MEDICINE

## 2023-02-07 PROCEDURE — 99214 OFFICE O/P EST MOD 30 MIN: CPT | Mod: S$PBB,,, | Performed by: INTERNAL MEDICINE
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
## 2023-02-22 ENCOUNTER — OFFICE VISIT (OUTPATIENT)
Dept: RADIATION ONCOLOGY | Facility: CLINIC | Age: 68
End: 2023-02-22
Payer: MEDICARE

## 2023-02-22 DIAGNOSIS — C61 PROSTATE CANCER: Primary | ICD-10-CM

## 2023-02-22 PROCEDURE — 99441 PR PHYSICIAN TELEPHONE EVALUATION 5-10 MIN: ICD-10-PCS | Mod: 95,,, | Performed by: STUDENT IN AN ORGANIZED HEALTH CARE EDUCATION/TRAINING PROGRAM

## 2023-02-22 PROCEDURE — 99441 PR PHYSICIAN TELEPHONE EVALUATION 5-10 MIN: CPT | Mod: 95,,, | Performed by: STUDENT IN AN ORGANIZED HEALTH CARE EDUCATION/TRAINING PROGRAM

## 2023-02-22 NOTE — PROGRESS NOTES
Established Patient - Audio Only Telehealth Visit     The patient location is: [facility]  The chief complaint leading to consultation is: prostate cancer s/p prostate directed RT per STAMPEDE , PSA surveillance  Visit type: Virtual visit with audio only (telephone)  Total time spent with patient: 9 min     The reason for the audio only service rather than synchronous audio and video virtual visit was related to technical difficulties or patient preference/necessity.     Each patient to whom I provide medical services by telemedicine is:  (1) informed of the relationship between the physician and patient and the respective role of any other health care provider with respect to management of the patient; and (2) notified that they may decline to receive medical services by telemedicine and may withdraw from such care at any time. Patient verbally consented to receive this service via voice-only telephone call.     This service was not originating from a related E/M service provided within the previous 7 days nor will  to an E/M service or procedure within the next 24 hours or my soonest available appointment.  Prevailing standard of care was able to be met in this audio-only visit.        Radiation Oncology Follow-up Note        Date of Service: 02/22/2023     Chief Complaint: metastatic prostate cancer s/p prostate directed RT per STAMPEDE     Reason for visit:  PSA surveillance     Referring Physician: Dr Suarez/Reddy (urology)     Therapy to Date:  Course: C1 PELVIS 2022     Treatment Site Ref. ID Energy Dose/Fx (Gy) #Fx Dose Correction (Gy) Total Dose (Gy) Start Date End Date Elapsed Days   IM Prostate PTV_High 6X 2.75 20 / 20 0 55 5/23/2022 6/17/2022 25         Diagnosis/Assessment:   Jm Gilliam Jr. is a 67 y.o. man with low burden Stage IVB (cT2c, cN1, cM1a, PSA: 145.4, Grade Group: 5) prostate adenocarcinoma, s/p TRUSPB with 66cc gland, 6/6 cores all involved with GG5 (GS 4+5=9), negative bone  scan , PEYTON with firm nodules in bilateral lobes, and PET axumin with significant non regional yajaira disease without bone mets.     PMH of HTN     S/p casodex 02/22/2022 with improvement in urinary function, on Lupron and recently started Xtandi (mid august 2022-) with Dr Fitzpatrick  S/p prostate directed radiation therapy 55Gy/20fx per STAMPEDE completed 6/17/2022     ECOG 1, still with hot flashes  PSA continues to respond on intensified ADT     Plan      Patient (who lives far away) insists he would like to continue followup with radonc, although not necessary      - We will see patient  along with Dr Fitzpatrick on 4/25/2023 at [facility]     Interval history      2/22/2022 .7     6/2/2022 PSA 10.4     6/17/2022 Tolerated radiation therapy well with G2 dysuria; without treatment delays or breaks.  c/w ibuprofen BID to daily  PSA in 3 months (already placed Dr Fitzpatrick) with followup     7/29/2022  Xtandi - PA submitted via Epic [facility]. Approved from 07/29/22 through 01/25/2023 8/26/2022 PSA 0.77    08/30/2022 doing well today with improvement in urinary function (less straining, polyuria and nocturia)  Patient would like to continue followup with radon although I explained it is most important to follow-up with Dr Fitzpatrick who is prescribing Lupron, xtandi and carrying out PSA surveillance.    10/25/2022 PSA 0.29    1/6/2023 PSA 0.16    Subjective   Over the phone is with his wife Coni, he reports no major changes from the last visit.  His main compliant is hot flashes  Still in great spirits overall.  He denies major issues.    Current Outpatient Medications on File Prior to Visit   Medication Sig Dispense Refill    amLODIPine (NORVASC) 10 MG tablet Take 1 tablet (10 mg total) by mouth once daily. 30 tablet 11    bicalutamide (CASODEX) 50 MG Tab Take 1 tablet (50 mg total) by mouth once daily. 30 tablet 0    flu vac 2022 [id number],PF, (FLUAD QUAD 2022-23,65Y UP,,PF,) 60 mcg (15 mcg x 4)/0.5 mL Syrg Inject  into the muscle. 0.5 mL 0    ondansetron (ZOFRAN-ODT) 8 MG TbDL Take 1 tablet (8 mg total) by mouth every 12 (twelve) hours as needed. 30 tablet 1    tamsulosin (FLOMAX) 0.4 mg Cap Take 1 capsule (0.4 mg total) by mouth once daily. 90 capsule 3    XTANDI 40 mg Tab Take 4 tablets (160mg) by mouth once daily as directed by physician. 120 tablet 2     No current facility-administered medications on file prior to visit.         Review of patient's allergies indicates:   Allergen Reactions    Nyquil liquicaps Swelling          Review of Systems   Negative unless as above     HPI:   Jm Gilliam Jr. is a 66 y.o. man with prostate cancer after presenting with elevated PSA.     Oncologic history  09/29/2021 .3  11/19/2021 Urology (Dr Suarez)              Prostate: nontender; firm nodules in bilateral lobes; size: 50 gm; seminal vesicles not palpated  11/24/2021 .4  11/24/2021 NM bone scan showed no definite evidence of osseous metastasis.     12/06/2021 CT A/P enlarged prostate with nodular contour and multiple dystrophic calcifications, right inguinal 1.2cm  and pelvic 1.1cm lymphadenopathy; multiple bilateral lower abdominal surgical anchors in the abdominal wall, likely from extraperitoneal hernia repair, no bone mets     12/22/2021 colonoscopy tubular adenoma, repeat colonoscopy in 3 years.     01/24/2022 standard TRUSPB (Dr Blakely)              66.4cc gland  6/6 cores all involved with Grade group 5 (Muna score 4+5=9)     02/08/2022 initial [facility] visit: reports feeling very well with some urgency and straining.   Denies BM issues, has 1 BM qAM.   Denies any ED issues.  The patient denies other major complaints or any pain  SILVERIO score 20 (4,4,4,4,4), mild ED  AUA score 22 (0,2,1,5,5,5,4) mostly dissatisfied  Recommended PET Axumin to r/o involvement of inguinal/pelvic nodes or other sites of mets.        2/16/2022 PET axumin: prominent increased uptake throughout the prostate gland SUVm 10.      Multiple enlarged radiotracer avid right common, external, and internal iliac chain lymph nodes (images 172, 182, 187, 190).  Index right external iliac chain lymph node measures 1.5 cm and demonstrates maximum SUV of 9.1.  Right common iliac chain lymph node cluster measuring up to 1 cm in short axis dimension with maximum SUV of 13.  Increased uptake within distal infrarenal pericaval lymph node measuring 0.7 cm with maximum SUV of 6.3 (image 164).  Increased uptake within a normal sized infrarenal aortocaval lymph node (image 154).  Physiologic uptake in the liver, pancreas, and bone marrow.     2/22/2022 .7     2/22/2022 Dr Fitzpatrick: plan for Lupron and will initiate Casodex first and then switch to Xtandi      Objective:   Physical Exam      No VS or PE     Laboratory: I have personally reviewed the patient's available laboratory values and summarized pertinent findings above in HPI.           Thank you for the opportunity to care for this patient. Please do not hesitate to contact me with any questions.     Juan King MD/PhD

## 2023-03-07 RX ORDER — AMLODIPINE BESYLATE 10 MG/1
10 TABLET ORAL DAILY
Qty: 30 TABLET | Refills: 2 | Status: SHIPPED | OUTPATIENT
Start: 2023-03-07 | End: 2023-06-05 | Stop reason: SDUPTHER

## 2023-03-07 NOTE — TELEPHONE ENCOUNTER
No new care gaps identified.  [facility] Embedded Care Gaps. Reference number: [id number]. 3/07/2023   2:46:38 PM CST

## 2023-03-07 NOTE — TELEPHONE ENCOUNTER
----- Message from Agatha Boyle sent at 3/7/2023 12:47 PM CST -----  Type:  RX Refill Request    Who Called:  Pt  Refill or New Rx: refill auth  RX Name and Strength: amLODIPine (NORVASC) 10 MG tablet [9069]  How is the patient currently taking it? (ex. 1XDay): Take 1 tablet (10 mg total) by mouth once daily. - Oral  Is this a 30 day or 90 day RX: 30  Preferred Pharmacy with phone number: Take 1 tablet (10 mg total) by mouth once daily. - Oral  Local or Mail Order: Local  Ordering Provider: Dr. Thomason  Would the patient rather a call back or a response via [facility]?  call  Best Call Back Number: [phone]  Additional Information:  Medication needs authorization.

## 2023-03-07 NOTE — TELEPHONE ENCOUNTER
Received refill request for amlodipine.  This was approved but he does need appointment as he has not been seen since 2021

## 2023-03-28 ENCOUNTER — PATIENT OUTREACH (OUTPATIENT)
Dept: ADMINISTRATIVE | Facility: HOSPITAL | Age: 68
End: 2023-03-28
Payer: MEDICARE

## 2023-03-28 NOTE — PROGRESS NOTES
Non-compliant GAP report chart review - Chart review completed for the following HM test if overdue  (Mammogram, Colonoscopy, Cervical Cancer Screening,  Diabetic lab testing, and/or Dilated EYE EXAM)      Care Everywhere and Media reports - updates requested and reviewed.      Has upcoming PCP appointment      Health Maintenance Due   Topic Date Due    Hepatitis C Screening  Never done    Shingles Vaccine (1 of 2) Never done    Hemoglobin A1c (Diabetic Prevention Screening)  Never done    Pneumococcal Vaccines (Age 65+) (2 - PPSV23 if available, else PCV20) 11/09/2021

## 2023-04-25 ENCOUNTER — INFUSION (OUTPATIENT)
Dept: INFUSION THERAPY | Facility: HOSPITAL | Age: 68
End: 2023-04-25
Payer: MEDICARE

## 2023-04-25 ENCOUNTER — OFFICE VISIT (OUTPATIENT)
Dept: RADIATION ONCOLOGY | Facility: CLINIC | Age: 68
End: 2023-04-25
Payer: MEDICARE

## 2023-04-25 ENCOUNTER — OFFICE VISIT (OUTPATIENT)
Dept: HEMATOLOGY/ONCOLOGY | Facility: CLINIC | Age: 68
End: 2023-04-25
Payer: MEDICARE

## 2023-04-25 VITALS
SYSTOLIC BLOOD PRESSURE: 129 MMHG | HEART RATE: 53 BPM | HEIGHT: 69 IN | RESPIRATION RATE: 16 BRPM | DIASTOLIC BLOOD PRESSURE: 84 MMHG | OXYGEN SATURATION: 99 % | WEIGHT: 167.38 LBS | BODY MASS INDEX: 24.79 KG/M2

## 2023-04-25 DIAGNOSIS — C61 PROSTATE CANCER: Primary | ICD-10-CM

## 2023-04-25 DIAGNOSIS — D49.9 IMMUNODEFICIENCY SECONDARY TO NEOPLASM: ICD-10-CM

## 2023-04-25 DIAGNOSIS — D84.81 IMMUNODEFICIENCY SECONDARY TO NEOPLASM: ICD-10-CM

## 2023-04-25 DIAGNOSIS — Z76.89 ENCOUNTER TO ESTABLISH CARE: ICD-10-CM

## 2023-04-25 DIAGNOSIS — I10 PRIMARY HYPERTENSION: ICD-10-CM

## 2023-04-25 DIAGNOSIS — H53.8 BLURRED VISION: ICD-10-CM

## 2023-04-25 PROCEDURE — 99999 PR PBB SHADOW E&M-EST. PATIENT-LVL II: CPT | Mod: PBBFAC,,, | Performed by: REGISTERED NURSE

## 2023-04-25 PROCEDURE — 96402 CHEMO HORMON ANTINEOPL SQ/IM: CPT

## 2023-04-25 PROCEDURE — 63600175 PHARM REV CODE 636 W HCPCS: Mod: JZ,JG | Performed by: REGISTERED NURSE

## 2023-04-25 PROCEDURE — 99999 PR PBB SHADOW E&M-EST. PATIENT-LVL III: ICD-10-PCS | Mod: PBBFAC,,, | Performed by: STUDENT IN AN ORGANIZED HEALTH CARE EDUCATION/TRAINING PROGRAM

## 2023-04-25 PROCEDURE — 99212 OFFICE O/P EST SF 10 MIN: CPT | Mod: PBBFAC,27 | Performed by: REGISTERED NURSE

## 2023-04-25 PROCEDURE — 99215 OFFICE O/P EST HI 40 MIN: CPT | Mod: S$PBB,,, | Performed by: REGISTERED NURSE

## 2023-04-25 PROCEDURE — 99215 PR OFFICE/OUTPT VISIT, EST, LEVL V, 40-54 MIN: ICD-10-PCS | Mod: S$PBB,,, | Performed by: REGISTERED NURSE

## 2023-04-25 PROCEDURE — 99214 OFFICE O/P EST MOD 30 MIN: CPT | Mod: S$PBB,,, | Performed by: STUDENT IN AN ORGANIZED HEALTH CARE EDUCATION/TRAINING PROGRAM

## 2023-04-25 PROCEDURE — 99999 PR PBB SHADOW E&M-EST. PATIENT-LVL III: CPT | Mod: PBBFAC,,, | Performed by: STUDENT IN AN ORGANIZED HEALTH CARE EDUCATION/TRAINING PROGRAM

## 2023-04-25 PROCEDURE — 99214 PR OFFICE/OUTPT VISIT, EST, LEVL IV, 30-39 MIN: ICD-10-PCS | Mod: S$PBB,,, | Performed by: STUDENT IN AN ORGANIZED HEALTH CARE EDUCATION/TRAINING PROGRAM

## 2023-04-25 PROCEDURE — 99213 OFFICE O/P EST LOW 20 MIN: CPT | Mod: PBBFAC | Performed by: STUDENT IN AN ORGANIZED HEALTH CARE EDUCATION/TRAINING PROGRAM

## 2023-04-25 PROCEDURE — 99999 PR PBB SHADOW E&M-EST. PATIENT-LVL II: ICD-10-PCS | Mod: PBBFAC,,, | Performed by: REGISTERED NURSE

## 2023-04-25 RX ADMIN — LEUPROLIDE ACETATE 45 MG: KIT at 01:04
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
## 2023-04-25 NOTE — NURSING
Patient presents to clinic for Lupron injection w/o acute complaints. Medication administered per orders. Tolerated well. Discharged in NAD

## 2023-04-25 NOTE — PROGRESS NOTES
Radiation Oncology Follow-up Note        Date of Service: 04/25/2023     Chief Complaint: metastatic prostate cancer s/p prostate directed RT per STAMPEDE     Reason for visit:  PSA surveillance     Referring Physician: Dr Suarez/Reddy (urology)     Therapy to Date:  Course: C1 PELVIS 2022     Treatment Site Ref. ID Energy Dose/Fx (Gy) #Fx Dose Correction (Gy) Total Dose (Gy) Start Date End Date Elapsed Days   IM Prostate PTV_High 6X 2.75 20 / 20 0 55 5/23/2022 6/17/2022 25         Diagnosis/Assessment:   Jm Gilliam Jr. is a 67 y.o. man with low burden Stage IVB (cT2c, cN1, cM1a, PSA: 145.4, Grade Group: 5) prostate adenocarcinoma, s/p TRUSPB with 66cc gland, 6/6 cores all involved with GG5 (GS 4+5=9), negative bone scan , PEYTON with firm nodules in bilateral lobes, and PET axumin with significant non regional yajaira disease without bone mets.     PMH of HTN     S/p casodex 02/22/2022 with improvement in urinary function, on Lupron and recently started Xtandi (mid august 2022-) with Dr Fitzpatrick  S/p prostate directed radiation therapy 55Gy/20fx per STAMPEDE completed 6/17/2022     ECOG 1, still with hot flashes  PSA continues to respond on intensified ADT, now down to 0.16     Plan   After speaking to Dede Ball today at  at [facility], we agreed that the patient only needs to be followed by medical oncology (Dede Ball /Dr Fitzpatrick).  I explained this to the patient and his wife and they agree with the plan. He has my information in case he needs to contact me       - suggested Flomax qHS to improve nocturia (instead of qAM)  - return to radiation oncology PRN     Interval history   02/22/2023 last audio radon follow-up: still with hot flashes, PSA continues to respond on intensified ADT     No recent PSA  Subjective   Patient is accompanied by his wife Coni.  He denies major changes from the last visit, except for weight gain (150 to 167# over 6 months) [place]  He still has significant hot flashes.  He reports nocturia x5 and takes flomax qAM, otherwise no urinary straining or dysuria  He denies other major issues.     Review of patient's allergies indicates:   Allergen Reactions    Nyquil liquicaps Swelling       Current Outpatient Medications on File Prior to Visit   Medication Sig Dispense Refill    amLODIPine (NORVASC) 10 MG tablet Take 1 tablet (10 mg total) by mouth once daily. 30 tablet 2    bicalutamide (CASODEX) 50 MG Tab Take 1 tablet (50 mg total) by mouth once daily. 30 tablet 0    flu vac 2022 [id number], (FLUAD QUAD 2022-23,65Y UP,,PF,) 60 mcg (15 mcg x 4)/0.5 mL Syrg Inject into the muscle. 0.5 mL 0    ondansetron (ZOFRAN-ODT) 8 MG TbDL Take 1 tablet (8 mg total) by mouth every 12 (twelve) hours as needed. 30 tablet 1    tamsulosin (FLOMAX) 0.4 mg Cap Take 1 capsule (0.4 mg total) by mouth once daily. 90 capsule 3    XTANDI 40 mg Tab Take 4 tablets (160mg) by mouth once daily as directed by physician. 120 tablet 2     No current facility-administered medications on file prior to visit.           Review of Systems   Negative unless as above     HPI:   Jm Gilliam Jr. is a 66 y.o. man with prostate cancer after presenting with elevated PSA.     Oncologic history  09/29/2021 .3  11/19/2021 Urology (Dr Suarez)              Prostate: nontender; firm nodules in bilateral lobes; size: 50 gm; seminal vesicles not palpated  11/24/2021 .4  11/24/2021 NM bone scan showed no definite evidence of osseous metastasis.     12/06/2021 CT A/P enlarged prostate with nodular contour and multiple dystrophic calcifications, right inguinal 1.2cm  and pelvic 1.1cm lymphadenopathy; multiple bilateral lower abdominal surgical anchors in the abdominal wall, likely from extraperitoneal hernia repair, no bone mets     12/22/2021 colonoscopy tubular adenoma, repeat colonoscopy in 3 years.     01/24/2022 standard TRUSPB (Dr Blakely)              66.4cc  gland  6/6 cores all involved with Grade group 5 (Moyers score 4+5=9)     02/08/2022 initial radon visit: reports feeling very well with some urgency and straining.   Denies BM issues, has 1 BM qAM.   Denies any ED issues.  The patient denies other major complaints or any pain  SILVERIO score 20 (4,4,4,4,4), mild ED  AUA score 22 (0,2,1,5,5,5,4) mostly dissatisfied  Recommended PET Axumin to r/o involvement of inguinal/pelvic nodes or other sites of mets.     2/16/2022 PET axumin: prominent increased uptake throughout the prostate gland SUVm 10.     Multiple enlarged radiotracer avid right common, external, and internal iliac chain lymph nodes (images 172, 182, 187, 190).  Index right external iliac chain lymph node measures 1.5 cm and demonstrates maximum SUV of 9.1.  Right common iliac chain lymph node cluster measuring up to 1 cm in short axis dimension with maximum SUV of 13.  Increased uptake within distal infrarenal pericaval lymph node measuring 0.7 cm with maximum SUV of 6.3 (image 164).  Increased uptake within a normal sized infrarenal aortocaval lymph node (image 154).  Physiologic uptake in the liver, pancreas, and bone marrow.     2/22/2022 .7     2/22/2022 Dr Fitzpatrick: plan for Lupron and will initiate Casodex first and then switch to Xtandi    2/22/2022 .7     6/2/2022 PSA 10.4     6/17/2022 Tolerated radiation therapy well with G2 dysuria; without treatment delays or breaks.  c/w ibuprofen BID to daily  PSA in 3 months (already placed Dr Fitzpatrick) with followup     7/29/2022  Xtandi - PA submitted via Epic [facility]. Approved from 07/29/22 through 01/25/2023 8/26/2022 PSA 0.77     08/30/2022 doing well today with improvement in urinary function (less straining, polyuria and nocturia)  Patient would like to continue followup with [facility] although I explained it is most important to follow-up with Dr Fitzpatrick who is prescribing Lupron, xtandi and carrying out PSA surveillance.     10/25/2022 PSA  0.29     1/6/2023 PSA 0.16      Objective:   Physical Exam  Vitals reviewed.   Constitutional:       Appearance: Normal appearance.   HENT:      Head: Normocephalic and atraumatic.   Pulmonary:      Effort: Pulmonary effort is normal.   Abdominal:      General: There is no distension.   Genitourinary:     Comments: PEYTON deferred (cT2c per Dr. Suarez), s/p prostate directed RT, on ADT  Musculoskeletal:         General: Normal range of motion.   Neurological:      General: No focal deficit present.      Mental Status: He is alert and oriented  Psychiatric:         Mood and Affect: Mood normal.         Behavior: Behavior normal.      Laboratory: I have personally reviewed the patient's available laboratory values and summarized pertinent findings above in HPI.      I spent approximately 30 minutes reviewing the available records and evaluating the patient, out of which over 50% of the time was spent face to face with the patient in counseling and coordinating this patient's care.     Thank you for the opportunity to care for this patient. Please do not hesitate to contact me with any questions.     Juan King MD/PhD

## 2023-05-09 DIAGNOSIS — C61 PROSTATE CANCER: Primary | ICD-10-CM

## 2023-05-30 ENCOUNTER — OFFICE VISIT (OUTPATIENT)
Dept: FAMILY MEDICINE | Facility: CLINIC | Age: 68
End: 2023-05-30
Payer: MEDICARE

## 2023-05-30 VITALS
HEIGHT: 69 IN | BODY MASS INDEX: 24.52 KG/M2 | SYSTOLIC BLOOD PRESSURE: 122 MMHG | WEIGHT: 165.56 LBS | OXYGEN SATURATION: 98 % | DIASTOLIC BLOOD PRESSURE: 72 MMHG | TEMPERATURE: 98 F | HEART RATE: 70 BPM

## 2023-05-30 DIAGNOSIS — C61 PROSTATE CANCER: ICD-10-CM

## 2023-05-30 DIAGNOSIS — H53.8 BLURRY VISION: ICD-10-CM

## 2023-05-30 DIAGNOSIS — Z79.818 ANDROGEN DEPRIVATION THERAPY: ICD-10-CM

## 2023-05-30 DIAGNOSIS — I70.0 AORTIC ATHEROSCLEROSIS: ICD-10-CM

## 2023-05-30 DIAGNOSIS — I10 HYPERTENSION, UNSPECIFIED TYPE: Primary | ICD-10-CM

## 2023-05-30 PROCEDURE — 99999 PR PBB SHADOW E&M-EST. PATIENT-LVL III: CPT | Mod: PBBFAC,,, | Performed by: FAMILY MEDICINE

## 2023-05-30 PROCEDURE — 99213 OFFICE O/P EST LOW 20 MIN: CPT | Mod: PBBFAC,PO | Performed by: FAMILY MEDICINE

## 2023-05-30 PROCEDURE — 99214 PR OFFICE/OUTPT VISIT, EST, LEVL IV, 30-39 MIN: ICD-10-PCS | Mod: S$PBB,,, | Performed by: FAMILY MEDICINE

## 2023-05-30 PROCEDURE — 99214 OFFICE O/P EST MOD 30 MIN: CPT | Mod: S$PBB,,, | Performed by: FAMILY MEDICINE

## 2023-05-30 PROCEDURE — 99999 PR PBB SHADOW E&M-EST. PATIENT-LVL III: ICD-10-PCS | Mod: PBBFAC,,, | Performed by: FAMILY MEDICINE
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
## 2023-06-05 RX ORDER — AMLODIPINE BESYLATE 10 MG/1
10 TABLET ORAL DAILY
Qty: 30 TABLET | Refills: 2 | Status: SHIPPED | OUTPATIENT
Start: 2023-06-05 | End: 2023-08-30 | Stop reason: SDUPTHER

## 2023-06-05 NOTE — TELEPHONE ENCOUNTER
No care due was identified.  [facility] Embedded Care Due Messages. Reference number: [id number].   6/05/2023 12:49:53 PM CDT

## 2023-08-30 RX ORDER — AMLODIPINE BESYLATE 10 MG/1
10 TABLET ORAL DAILY
Qty: 30 TABLET | Refills: 2 | Status: SHIPPED | OUTPATIENT
Start: 2023-08-30 | End: 2023-11-28 | Stop reason: SDUPTHER

## 2023-08-30 NOTE — TELEPHONE ENCOUNTER
No care due was identified.  [facility] Embedded Care Due Messages. Reference number: [id number].   8/30/2023 11:54:40 AM CDT   Patients  called to report that Jayne had been admitted to the hospital here in [place], then transported to [facility] with kidney issues yesterday. Golden stated that at one point Jayne was having trouble with her legs swelling and pain and asked for one of his water pills that he did give her with relief to her symptoms. He thinks she may have been in his medicine and taking them herself the past week or so.

## 2023-08-31 ENCOUNTER — OFFICE VISIT (OUTPATIENT)
Dept: HEMATOLOGY/ONCOLOGY | Facility: CLINIC | Age: 68
End: 2023-08-31
Payer: MEDICARE

## 2023-08-31 ENCOUNTER — LAB VISIT (OUTPATIENT)
Dept: LAB | Facility: HOSPITAL | Age: 68
End: 2023-08-31
Attending: REGISTERED NURSE
Payer: MEDICARE

## 2023-08-31 VITALS
BODY MASS INDEX: 24.36 KG/M2 | HEART RATE: 52 BPM | TEMPERATURE: 97 F | HEIGHT: 69 IN | OXYGEN SATURATION: 98 % | SYSTOLIC BLOOD PRESSURE: 133 MMHG | RESPIRATION RATE: 18 BRPM | DIASTOLIC BLOOD PRESSURE: 82 MMHG | WEIGHT: 164.44 LBS

## 2023-08-31 DIAGNOSIS — I10 PRIMARY HYPERTENSION: ICD-10-CM

## 2023-08-31 DIAGNOSIS — C61 PROSTATE CANCER: ICD-10-CM

## 2023-08-31 DIAGNOSIS — C61 PROSTATE CANCER: Primary | ICD-10-CM

## 2023-08-31 LAB
ALBUMIN SERPL BCP-MCNC: 3.8 G/DL (ref 3.5–5.2)
ALP SERPL-CCNC: 88 U/L (ref 55–135)
ALT SERPL W/O P-5'-P-CCNC: 7 U/L (ref 10–44)
ANION GAP SERPL CALC-SCNC: 11 MMOL/L (ref 8–16)
AST SERPL-CCNC: 13 U/L (ref 10–40)
BILIRUB SERPL-MCNC: 0.2 MG/DL (ref 0.1–1)
BUN SERPL-MCNC: 19 MG/DL (ref 8–23)
CALCIUM SERPL-MCNC: 9.8 MG/DL (ref 8.7–10.5)
CHLORIDE SERPL-SCNC: 106 MMOL/L (ref 95–110)
CO2 SERPL-SCNC: 27 MMOL/L (ref 23–29)
COMPLEXED PSA SERPL-MCNC: 0.04 NG/ML (ref 0–4)
CREAT SERPL-MCNC: 1.3 MG/DL (ref 0.5–1.4)
ERYTHROCYTE [DISTWIDTH] IN BLOOD BY AUTOMATED COUNT: 13.6 % (ref 11.5–14.5)
EST. GFR  (NO RACE VARIABLE): 59.5 ML/MIN/1.73 M^2
GLUCOSE SERPL-MCNC: 101 MG/DL (ref 70–110)
HCT VFR BLD AUTO: 35.9 % (ref 40–54)
HGB BLD-MCNC: 11.9 G/DL (ref 14–18)
IMM GRANULOCYTES # BLD AUTO: 0.01 K/UL (ref 0–0.04)
MCH RBC QN AUTO: 29.5 PG (ref 27–31)
MCHC RBC AUTO-ENTMCNC: 33.1 G/DL (ref 32–36)
MCV RBC AUTO: 89 FL (ref 82–98)
NEUTROPHILS # BLD AUTO: 2.2 K/UL (ref 1.8–7.7)
PLATELET # BLD AUTO: 274 K/UL (ref 150–450)
PMV BLD AUTO: 9.8 FL (ref 9.2–12.9)
POTASSIUM SERPL-SCNC: 4.1 MMOL/L (ref 3.5–5.1)
PROT SERPL-MCNC: 7.6 G/DL (ref 6–8.4)
RBC # BLD AUTO: 4.03 M/UL (ref 4.6–6.2)
SODIUM SERPL-SCNC: 144 MMOL/L (ref 136–145)
WBC # BLD AUTO: 4.32 K/UL (ref 3.9–12.7)

## 2023-08-31 PROCEDURE — 36415 COLL VENOUS BLD VENIPUNCTURE: CPT | Performed by: REGISTERED NURSE

## 2023-08-31 PROCEDURE — 84153 ASSAY OF PSA TOTAL: CPT | Performed by: REGISTERED NURSE

## 2023-08-31 PROCEDURE — 85027 COMPLETE CBC AUTOMATED: CPT | Performed by: REGISTERED NURSE

## 2023-08-31 PROCEDURE — 99999 PR PBB SHADOW E&M-EST. PATIENT-LVL III: CPT | Mod: PBBFAC,,, | Performed by: INTERNAL MEDICINE

## 2023-08-31 PROCEDURE — 80053 COMPREHEN METABOLIC PANEL: CPT | Performed by: REGISTERED NURSE

## 2023-08-31 PROCEDURE — 99213 OFFICE O/P EST LOW 20 MIN: CPT | Mod: PBBFAC | Performed by: INTERNAL MEDICINE

## 2023-08-31 PROCEDURE — 99214 OFFICE O/P EST MOD 30 MIN: CPT | Mod: S$PBB,,, | Performed by: INTERNAL MEDICINE

## 2023-08-31 PROCEDURE — 99999 PR PBB SHADOW E&M-EST. PATIENT-LVL III: ICD-10-PCS | Mod: PBBFAC,,, | Performed by: INTERNAL MEDICINE

## 2023-08-31 PROCEDURE — 99214 PR OFFICE/OUTPT VISIT, EST, LEVL IV, 30-39 MIN: ICD-10-PCS | Mod: S$PBB,,, | Performed by: INTERNAL MEDICINE
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
## 2023-09-23 ENCOUNTER — IMMUNIZATION (OUTPATIENT)
Dept: FAMILY MEDICINE | Facility: CLINIC | Age: 68
End: 2023-09-23
Payer: MEDICARE

## 2023-09-23 PROCEDURE — 99999PBSHW FLU VACCINE - QUADRIVALENT - ADJUVANTED: Mod: PBBFAC,,,

## 2023-09-23 PROCEDURE — 99999PBSHW FLU VACCINE - QUADRIVALENT - ADJUVANTED: ICD-10-PCS | Mod: PBBFAC,,,

## 2023-09-23 PROCEDURE — G0008 ADMIN INFLUENZA VIRUS VAC: HCPCS | Mod: PBBFAC,PO

## 2023-10-09 DIAGNOSIS — C61 PROSTATE CANCER: ICD-10-CM

## 2023-10-10 ENCOUNTER — INFUSION (OUTPATIENT)
Dept: INFUSION THERAPY | Facility: HOSPITAL | Age: 68
End: 2023-10-10
Payer: MEDICARE

## 2023-10-10 DIAGNOSIS — C61 PROSTATE CANCER: Primary | ICD-10-CM

## 2023-10-10 PROCEDURE — 96402 CHEMO HORMON ANTINEOPL SQ/IM: CPT

## 2023-10-10 PROCEDURE — 63600175 PHARM REV CODE 636 W HCPCS: Mod: JZ,JG | Performed by: REGISTERED NURSE

## 2023-10-10 RX ADMIN — LEUPROLIDE ACETATE 45 MG: KIT at 01:10

## 2023-10-12 RX ORDER — TAMSULOSIN HYDROCHLORIDE 0.4 MG/1
0.4 CAPSULE ORAL
Qty: 90 CAPSULE | Refills: 3 | Status: SHIPPED | OUTPATIENT
Start: 2023-10-12

## 2023-11-06 ENCOUNTER — LAB VISIT (OUTPATIENT)
Dept: LAB | Facility: HOSPITAL | Age: 68
End: 2023-11-06
Attending: INTERNAL MEDICINE
Payer: MEDICARE

## 2023-11-06 ENCOUNTER — OFFICE VISIT (OUTPATIENT)
Dept: HEMATOLOGY/ONCOLOGY | Facility: CLINIC | Age: 68
End: 2023-11-06
Payer: MEDICARE

## 2023-11-06 VITALS
HEIGHT: 69 IN | BODY MASS INDEX: 24.75 KG/M2 | RESPIRATION RATE: 17 BRPM | HEART RATE: 56 BPM | OXYGEN SATURATION: 100 % | TEMPERATURE: 97 F | WEIGHT: 167.13 LBS | DIASTOLIC BLOOD PRESSURE: 90 MMHG | SYSTOLIC BLOOD PRESSURE: 152 MMHG

## 2023-11-06 DIAGNOSIS — M81.6 LOCALIZED OSTEOPOROSIS (LEQUESNE): ICD-10-CM

## 2023-11-06 DIAGNOSIS — C61 PROSTATE CANCER: ICD-10-CM

## 2023-11-06 DIAGNOSIS — I10 PRIMARY HYPERTENSION: ICD-10-CM

## 2023-11-06 DIAGNOSIS — C61 PROSTATE CANCER: Primary | ICD-10-CM

## 2023-11-06 LAB
ALBUMIN SERPL BCP-MCNC: 3.7 G/DL (ref 3.5–5.2)
ALP SERPL-CCNC: 92 U/L (ref 55–135)
ALT SERPL W/O P-5'-P-CCNC: 8 U/L (ref 10–44)
ANION GAP SERPL CALC-SCNC: 6 MMOL/L (ref 8–16)
AST SERPL-CCNC: 12 U/L (ref 10–40)
BASOPHILS # BLD AUTO: 0.03 K/UL (ref 0–0.2)
BASOPHILS NFR BLD: 0.7 % (ref 0–1.9)
BILIRUB SERPL-MCNC: 0.2 MG/DL (ref 0.1–1)
BUN SERPL-MCNC: 15 MG/DL (ref 8–23)
CALCIUM SERPL-MCNC: 9.7 MG/DL (ref 8.7–10.5)
CHLORIDE SERPL-SCNC: 107 MMOL/L (ref 95–110)
CO2 SERPL-SCNC: 26 MMOL/L (ref 23–29)
COMPLEXED PSA SERPL-MCNC: 0.04 NG/ML (ref 0–4)
CREAT SERPL-MCNC: 1.1 MG/DL (ref 0.5–1.4)
DIFFERENTIAL METHOD: ABNORMAL
EOSINOPHIL # BLD AUTO: 0.1 K/UL (ref 0–0.5)
EOSINOPHIL NFR BLD: 2 % (ref 0–8)
ERYTHROCYTE [DISTWIDTH] IN BLOOD BY AUTOMATED COUNT: 13.5 % (ref 11.5–14.5)
EST. GFR  (NO RACE VARIABLE): >60 ML/MIN/1.73 M^2
GLUCOSE SERPL-MCNC: 85 MG/DL (ref 70–110)
HCT VFR BLD AUTO: 36.5 % (ref 40–54)
HGB BLD-MCNC: 11.7 G/DL (ref 14–18)
IMM GRANULOCYTES # BLD AUTO: 0.01 K/UL (ref 0–0.04)
IMM GRANULOCYTES NFR BLD AUTO: 0.2 % (ref 0–0.5)
LYMPHOCYTES # BLD AUTO: 1.5 K/UL (ref 1–4.8)
LYMPHOCYTES NFR BLD: 36.9 % (ref 18–48)
MCH RBC QN AUTO: 29 PG (ref 27–31)
MCHC RBC AUTO-ENTMCNC: 32.1 G/DL (ref 32–36)
MCV RBC AUTO: 90 FL (ref 82–98)
MONOCYTES # BLD AUTO: 0.6 K/UL (ref 0.3–1)
MONOCYTES NFR BLD: 13.5 % (ref 4–15)
NEUTROPHILS # BLD AUTO: 1.9 K/UL (ref 1.8–7.7)
NEUTROPHILS NFR BLD: 46.7 % (ref 38–73)
NRBC BLD-RTO: 0 /100 WBC
PLATELET # BLD AUTO: 302 K/UL (ref 150–450)
PMV BLD AUTO: 9.9 FL (ref 9.2–12.9)
POTASSIUM SERPL-SCNC: 4.6 MMOL/L (ref 3.5–5.1)
PROT SERPL-MCNC: 7.7 G/DL (ref 6–8.4)
RBC # BLD AUTO: 4.04 M/UL (ref 4.6–6.2)
SODIUM SERPL-SCNC: 139 MMOL/L (ref 136–145)
WBC # BLD AUTO: 4.07 K/UL (ref 3.9–12.7)

## 2023-11-06 PROCEDURE — 80053 COMPREHEN METABOLIC PANEL: CPT | Performed by: INTERNAL MEDICINE

## 2023-11-06 PROCEDURE — 84153 ASSAY OF PSA TOTAL: CPT | Performed by: INTERNAL MEDICINE

## 2023-11-06 PROCEDURE — 99999 PR PBB SHADOW E&M-EST. PATIENT-LVL III: ICD-10-PCS | Mod: PBBFAC,,, | Performed by: INTERNAL MEDICINE

## 2023-11-06 PROCEDURE — 99213 OFFICE O/P EST LOW 20 MIN: CPT | Mod: PBBFAC | Performed by: INTERNAL MEDICINE

## 2023-11-06 PROCEDURE — 36415 COLL VENOUS BLD VENIPUNCTURE: CPT | Performed by: INTERNAL MEDICINE

## 2023-11-06 PROCEDURE — 99999 PR PBB SHADOW E&M-EST. PATIENT-LVL III: CPT | Mod: PBBFAC,,, | Performed by: INTERNAL MEDICINE

## 2023-11-06 PROCEDURE — 99214 PR OFFICE/OUTPT VISIT, EST, LEVL IV, 30-39 MIN: ICD-10-PCS | Mod: S$PBB,,, | Performed by: INTERNAL MEDICINE

## 2023-11-06 PROCEDURE — 99214 OFFICE O/P EST MOD 30 MIN: CPT | Mod: S$PBB,,, | Performed by: INTERNAL MEDICINE

## 2023-11-06 PROCEDURE — 85025 COMPLETE CBC W/AUTO DIFF WBC: CPT | Performed by: INTERNAL MEDICINE
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
## 2023-11-15 DIAGNOSIS — C61 PROSTATE CANCER: Primary | ICD-10-CM

## 2023-11-15 RX ORDER — ENZALUTAMIDE 40 MG/1
TABLET ORAL
Qty: 120 TABLET | Refills: 2 | Status: SHIPPED | OUTPATIENT
Start: 2023-11-15 | End: 2023-11-15

## 2023-11-20 DIAGNOSIS — C61 PROSTATE CANCER: ICD-10-CM

## 2023-11-20 NOTE — TELEPHONE ENCOUNTER
"----- Message from Rashad Priest sent at 11/20/2023  4:40 PM CST -----  Consult/Advisory:          [facility]      Contact Preference?: [phone]      Provider Name: Evelyn      Does patient feel the need to be seen today? No      What is the nature of the call?: Requesting a valid prescription for enzalutamide (XTANDI) 40 mg Cap          Additional Notes:  "Thank you for all that you do for our patients"        "

## 2023-11-28 NOTE — TELEPHONE ENCOUNTER
No care due was identified.  Health [facility] Embedded Care Due Messages. Reference number: [id number].   11/28/2023 3:17:17 PM CST

## 2023-11-29 RX ORDER — AMLODIPINE BESYLATE 10 MG/1
10 TABLET ORAL DAILY
Qty: 30 TABLET | Refills: 2 | Status: SHIPPED | OUTPATIENT
Start: 2023-11-29 | End: 2024-03-03 | Stop reason: SDUPTHER

## 2024-01-11 DIAGNOSIS — Z00.00 ENCOUNTER FOR MEDICARE ANNUAL WELLNESS EXAM: ICD-10-CM

## 2024-03-03 NOTE — TELEPHONE ENCOUNTER
No care due was identified.  Health [facility] Embedded Care Due Messages. Reference number: [id number].   3/03/2024 12:06:57 PM CST

## 2024-03-04 RX ORDER — AMLODIPINE BESYLATE 10 MG/1
10 TABLET ORAL DAILY
Qty: 30 TABLET | Refills: 11 | Status: SHIPPED | OUTPATIENT
Start: 2024-03-04

## 2024-04-08 NOTE — PROGRESS NOTES
Patient arrived to appointment with spouse. Patient was checked in for appointment. Vitals were taken, medications and allergies, fall risk, depression screening were completed by the medical assistant. Upon the provider entering room, patient verbalizes confusion as to what the visit is about. I explained visit in detail twice to patient and spouse. Patient thought this visit was an annual wellness visit with the PCP. The patient declined all health risk questionnaires - memory testing, hearing testing, social determinates of health, chronic condition questions, living will, and health maintenance discussion. Patient aware this is a free visit for medicare. Patient and spouse ultimately decided to leave appointment without being seen. I am not responsible for vital signs or patient care as patient left without me assessing or interviewing.    Marcy Hernandez, MSN, APRN, FNP-C  Family Medicine  Office [phone]

## 2024-04-09 ENCOUNTER — OFFICE VISIT (OUTPATIENT)
Dept: FAMILY MEDICINE | Facility: CLINIC | Age: 69
End: 2024-04-09
Payer: MEDICARE

## 2024-04-09 VITALS
WEIGHT: 165.38 LBS | DIASTOLIC BLOOD PRESSURE: 88 MMHG | OXYGEN SATURATION: 97 % | BODY MASS INDEX: 24.5 KG/M2 | HEIGHT: 69 IN | HEART RATE: 54 BPM | SYSTOLIC BLOOD PRESSURE: 154 MMHG

## 2024-04-09 DIAGNOSIS — I10 HYPERTENSION, UNSPECIFIED TYPE: ICD-10-CM

## 2024-04-09 DIAGNOSIS — D84.81 IMMUNODEFICIENCY SECONDARY TO NEOPLASM: ICD-10-CM

## 2024-04-09 DIAGNOSIS — D49.9 IMMUNODEFICIENCY SECONDARY TO NEOPLASM: ICD-10-CM

## 2024-04-09 DIAGNOSIS — C61 PROSTATE CANCER: Primary | ICD-10-CM

## 2024-04-09 DIAGNOSIS — I70.0 AORTIC ATHEROSCLEROSIS: ICD-10-CM

## 2024-04-09 PROCEDURE — 99499 UNLISTED E&M SERVICE: CPT | Mod: S$PBB,,, | Performed by: NURSE PRACTITIONER

## 2024-04-09 PROCEDURE — 99999 PR PBB SHADOW E&M-EST. PATIENT-LVL III: CPT | Mod: PBBFAC,,, | Performed by: NURSE PRACTITIONER

## 2024-04-09 PROCEDURE — 99213 OFFICE O/P EST LOW 20 MIN: CPT | Mod: PBBFAC,PO | Performed by: NURSE PRACTITIONER

## 2024-05-02 NOTE — PROGRESS NOTES
Per chart review, last OV with Dr. Blandon on 9/29/22.    Hydrochlorothiazide prescribed on 5/1/23 for one year by Dr. Blandon.     Last labs done 9/30/22.     Spoke to patient.     OV scheduled for 5/22/24 with Dr. Blandon.     Labs ordered.     Courtesy refill of hctz provided.     Patient acknowledged understanding and thankful for call.          Received a call/voice mail message from patient's wife informing me that they have received patient's treatment schedule. Returned the call to their cell number [phone]. Patient's last treatment will be on 6/17 and patient would like to stay at the [facility] to a check out on 6/18. Revised patient's lodging request form and faxed it to the [facility] at [phone]; also emailed it to [email or web address]. Called the [facility] at [phone] and spoke with Minor, who confirmed patient's stay request. Will continue to follow and assist as needs are identified.

## 2024-06-18 ENCOUNTER — TELEPHONE (OUTPATIENT)
Dept: FAMILY MEDICINE | Facility: CLINIC | Age: 69
End: 2024-06-18
Payer: MEDICARE

## 2024-06-18 NOTE — TELEPHONE ENCOUNTER
Returned patient's call. He stated that his blood pressure at the time of the call was 174/111 but had been higher earlier in the day, though he didn't have the number in front of him. He stated that the medication he takes as a cancer patient makes him have hot flashes so he's sweating a lot, but also has increased urination and was concerned about dehydration. He denied any other symptoms. I recommended that he go to an emergency room based on his extremely high blood pressure and concern for dehydration. He asked what other symptoms he should look for, but eventually agreed to visit a local ([place]) emergency room.

## 2024-06-18 NOTE — TELEPHONE ENCOUNTER
Noted, if multiple symptoms and severely elevated BP shaylee with his hx of metastatic prostate cancer do rec urgent eval    LOV here 5/2023, would rec f/u visit to further assess bp mgmt shaylee after any urgent issues resolved.

## 2024-06-18 NOTE — TELEPHONE ENCOUNTER
----- Message from Henny Tomlinson sent at 6/18/2024 11:31 AM CDT -----  .Type:  Needs Medical Advice    Who Called: pt    Would the patient rather a call back or a response via [facility]? Call back  Best Call Back Number: [phone]  Additional Information:     Pt stated his blood pressure been high and would like a call back as soon as possible

## 2024-06-25 ENCOUNTER — PATIENT MESSAGE (OUTPATIENT)
Dept: ADMINISTRATIVE | Facility: HOSPITAL | Age: 69
End: 2024-06-25
Payer: MEDICARE

## 2024-06-26 ENCOUNTER — TELEPHONE (OUTPATIENT)
Dept: ADMINISTRATIVE | Facility: HOSPITAL | Age: 69
End: 2024-06-26
Payer: MEDICARE

## 2024-06-26 VITALS — DIASTOLIC BLOOD PRESSURE: 82 MMHG | SYSTOLIC BLOOD PRESSURE: 138 MMHG

## 2024-07-24 DIAGNOSIS — C61 PROSTATE CANCER: Primary | ICD-10-CM

## 2024-07-24 RX ORDER — ENZALUTAMIDE 40 MG/1
TABLET ORAL
Qty: 120 TABLET | Refills: 1 | Status: SHIPPED | OUTPATIENT
Start: 2024-07-24

## 2024-07-24 NOTE — TELEPHONE ENCOUNTER
----- Message from Latoya Freddie sent at 7/24/2024  3:41 PM CDT -----  Regarding: Presription Refill  Contact: elle with [employer] @ [phone]  RX Name:enzalutamide (XTANDI) 40 mg Cap     How is it taken:Sig - Route: Take 160 mg by mouth once daily Take as directed by physician.. - Ora     Quantity:120 capsule     Preferred Pharmacy with phone number:  [facility] [address]  [address]  [facility] [place] [address]  Phone: [phone] Fax: [phone]       Ordering Provider:Dr Jovana Rivas       Contact Preference:Heidi with [employer] @ [phone]      Additional Info:New Prescription is needed

## 2024-08-28 DIAGNOSIS — C61 PROSTATE CANCER: ICD-10-CM

## 2024-08-28 RX ORDER — ENZALUTAMIDE 40 MG/1
160 TABLET ORAL DAILY
Qty: 120 TABLET | Refills: 1 | Status: SHIPPED | OUTPATIENT
Start: 2024-08-28

## 2024-08-28 NOTE — TELEPHONE ENCOUNTER
"REFILL request routed to provider.       ----- Message from Rashad Priest sent at 8/28/2024  2:42 PM CDT -----  RX Name and Strength:  XTANDI 40 mg Tab       How is the patient currently taking it?  TAKE 4 TABLETS (160 MG) BY MOUTH ONCE DAILY TAKE AS DIRECTED BY PHYSICIAN.      Is this a 30 day or 90 day Rx?   120 tablet      Preferred Pharmacy with phone number:     [facility] - [facility] [address]  [address]  [facility] [address]  Phone: [phone]    Fax: [phone]      Local or Mail Order: Mail Order      Ordering Provider: Nanette      Contact Preference: [phone] (home)       Additional Information:  "Thank you for all that you do for our patients"  "

## 2024-10-18 ENCOUNTER — IMMUNIZATION (OUTPATIENT)
Dept: FAMILY MEDICINE | Facility: CLINIC | Age: 69
End: 2024-10-18
Payer: MEDICARE

## 2024-10-18 DIAGNOSIS — Z23 NEED FOR VACCINATION: Primary | ICD-10-CM

## 2024-10-30 DIAGNOSIS — C61 PROSTATE CANCER: ICD-10-CM

## 2025-01-21 ENCOUNTER — PATIENT OUTREACH (OUTPATIENT)
Dept: ADMINISTRATIVE | Facility: HOSPITAL | Age: 70
End: 2025-01-21
Payer: MEDICARE

## 2025-01-21 ENCOUNTER — PATIENT MESSAGE (OUTPATIENT)
Dept: ADMINISTRATIVE | Facility: HOSPITAL | Age: 70
End: 2025-01-21
Payer: MEDICARE

## 2025-01-21 DIAGNOSIS — Z12.11 SPECIAL SCREENING FOR MALIGNANT NEOPLASMS, COLON: Primary | ICD-10-CM

## 2025-01-21 NOTE — PROGRESS NOTES
Population Health Chart Review & Patient Outreach Details      Additional Page Hospital Health Notes:               Updates Requested / Reviewed:      Updated Care Coordination Note, Care Everywhere, and Immunizations Reconciliation Completed or Queried: [facility] Topics Overdue:      [facility] Score: 1     Colon Cancer Screening  Uncontrolled BP    Pneumonia Vaccine  Shingles/Zoster Vaccine  RSV Vaccine                  Health Maintenance Topic(s) Outreach Outcomes & Actions Taken:    Colorectal Cancer Screening - Outreach Outcomes & Actions Taken  : Colonoscopy Case Request / Referral / Home Test Order Placed

## 2025-02-17 RX ORDER — AMLODIPINE BESYLATE 10 MG/1
10 TABLET ORAL DAILY
Qty: 30 TABLET | Refills: 11 | Status: SHIPPED | OUTPATIENT
Start: 2025-02-17

## 2025-02-17 NOTE — TELEPHONE ENCOUNTER
Care Due:                  Date            Visit Type   Department     Provider  --------------------------------------------------------------------------------                                MYCHART                              ANNUAL                              CHECKUP/PHY  [facility] FAMILY  Last Visit: 05-      [facility]       Demetri Thomason  Next Visit: None Scheduled  None         None Found                                                            Last  Test          Frequency    Reason                     Performed    Due Date  --------------------------------------------------------------------------------    Office Visit  15 months..  amLODIPine...............  05- 08-    [facility] Embedded Care Due Messages. Reference number: [id number].   2/16/2025 6:03:29 PM CST

## 2025-02-22 DIAGNOSIS — Z00.00 ENCOUNTER FOR MEDICARE ANNUAL WELLNESS EXAM: ICD-10-CM

## 2025-02-26 ENCOUNTER — TELEPHONE (OUTPATIENT)
Dept: ENDOSCOPY | Facility: HOSPITAL | Age: 70
End: 2025-02-26
Payer: MEDICARE

## 2025-02-26 VITALS — HEIGHT: 69 IN | WEIGHT: 160 LBS | BODY MASS INDEX: 23.7 KG/M2

## 2025-02-26 DIAGNOSIS — Z12.11 SCREEN FOR COLON CANCER: Primary | ICD-10-CM

## 2025-02-26 RX ORDER — SODIUM, POTASSIUM,MAG SULFATES 17.5-3.13G
1 SOLUTION, RECONSTITUTED, ORAL ORAL DAILY
Qty: 1 KIT | Refills: 0 | Status: SHIPPED | OUTPATIENT
Start: 2025-02-26 | End: 2025-02-28

## 2025-02-26 NOTE — TELEPHONE ENCOUNTER
----- Message from Gabrielle sent at 2/26/2025  7:59 AM CST -----    ----- Message -----  From: Leena Hart RN  Sent: 2/25/2025  10:52 AM CST  To: [facility] Schedulers    Mr Gilliam has had Dr Solis before for his colonoscopy and would like to stay with him.  He is ready to schedule now but needs to be scheduled at [facility].  Thanks in advance

## 2025-02-26 NOTE — TELEPHONE ENCOUNTER
Referral for procedure from  last procedure report - Pt due for follow up procedure    Spoke to Jm Gilliam Jr. to schedule Colonoscopy       Physician to perform procedure(s) Dr. JUNITO Solis  Date of Procedure (s) 3/24/25  Arrival Time 12:30 PM  Time of Procedure(s) 1:30 PM   Location of Procedure(s) [facility]  Type of Rx Prep sent to patient's pharmacy: Suprep  Instructions provided to patient via [facility]  Patient denies use of blood thinners, GLP-1 medications, and weight loss medications.  The following information was discussed with patient, and patient verbalized understanding:  Screening questionnaire reviewed with patient and complete. If procedure requires anesthesia, a responsible adult needs to be present to accompany the patient home. Appointment details are tentative, especially check-in time. Patient will receive a pre-op call 7 days prior to appointment to confirm check-in time for procedure. If applicable the patient should contact their pharmacy to verify Rx for procedure prep is ready for pick-up. Patient was instructed to call the scheduling department at [phone] if pharmacy states no Rx is available. Patient was also advised to call the endoscopy scheduling department if any questions or concerns arise.      [facility] Scheduling Department

## 2025-03-05 DIAGNOSIS — C61 PROSTATE CANCER: ICD-10-CM

## 2025-03-05 RX ORDER — ENZALUTAMIDE 40 MG/1
TABLET ORAL
Qty: 120 TABLET | Refills: 0 | Status: SHIPPED | OUTPATIENT
Start: 2025-03-05

## 2025-03-05 NOTE — TELEPHONE ENCOUNTER
Refill request routed to provider.       ----- Message from Kalie sent at 3/5/2025 10:43 AM CST -----  Regarding: refill request  Contact: [phone] AgustinCued   Is this a Refill or New Rx: refill  Rx Name and Strength:enzalutamide (XTANDI) 40 mg Cap  Preferred Pharmacy with phone number:[facility] - [place], [place] - [id number] [address]: [phone] Fax: [phone]

## 2025-03-20 ENCOUNTER — TELEPHONE (OUTPATIENT)
Dept: ENDOSCOPY | Facility: HOSPITAL | Age: 70
End: 2025-03-20
Payer: MEDICARE

## 2025-03-20 NOTE — TELEPHONE ENCOUNTER
Left message instructing patient to call dept @ [phone] between 8am-4pm.    Arrival time to be given 1230 *  (Message sent via [facility] portal)

## 2025-03-21 ENCOUNTER — TELEPHONE (OUTPATIENT)
Dept: ENDOSCOPY | Facility: HOSPITAL | Age: 70
End: 2025-03-21
Payer: MEDICARE

## 2025-03-25 ENCOUNTER — TELEPHONE (OUTPATIENT)
Dept: ENDOSCOPY | Facility: HOSPITAL | Age: 70
End: 2025-03-25
Payer: MEDICARE

## 2025-03-25 NOTE — TELEPHONE ENCOUNTER
Spoke to patient and rescheduled colonoscopy exam for 04/22/25 @ 1pm. Patient has prep and updated instructions sent to portal.

## 2025-03-25 NOTE — TELEPHONE ENCOUNTER
Colonoscopy Procedure Prep Instructions  SUPREP (sodium sulfate/potassium sulfate/magnesium sulfate)     Date of procedure: 04/22/25 - Arrive at 12:00 PM     Location of Department:   [facility] [address] [place] [address]   Stop in the hospital lobby on the 1st floor to get registered, then take the hospital elevators to the 2nd floor waiting room     As soon as possible:   your prep from pharmacy and over the counter DULCOLAX LAXATIVE TABLETS (Bisacodyl 5 mg oral tab)     On the day before your procedure   What You CAN do:   You may have clear liquids ONLY -see below for list.      Liquids That Are OK to Drink:   Water  Sports drinks (Gatorade, Power-Aid)  Coffee or tea (no cream or nondairy creamer)  Clear juices without pulp (apple, white grape)  Gelatin desserts (no fruit or toppings)  Clear soda (sprite, coke, ginger ale)  Chicken broth (until 12 midnight the night before procedure)     What You CANNOT do:   Do not EAT solid food, drink milk or anything colored red.  Do not drink alcohol.  Do not take oral medications within 1 hour of starting each dose of SUPREP.  No gum chewing or candy morning of procedure.     Note:   (Please disregard the insert instructions from pharmacy).  SUPREP Bowel Prep Kit is indicated for cleansing of the colon as a preparation for colonoscopy in adults.   Be sure to tell your doctor about all the medicines you take, including prescription and non-prescription medicines, vitamins, and herbal supplements. SUPREP Bowel Prep Kit may affect how other medicines work.  Medication taken by mouth may not be absorbed properly when taken within 1 hour before the start of each dose of SUPREP Bowel Prep Kit.     It is not uncommon to experience some abdominal cramping, nausea and/or vomiting when taking the prep. If you have nausea and/or vomiting while taking the prep, stop drinking for 20 to 30 minutes then continue.     How to take prep:     SUPREP Bowel  Prep Kit is a (2-day) prep.   Both 6-ounce bottles are required for a complete preparation for colonoscopy. Dilute the solution concentrate as directed prior to use. You must drink water with each dose of SUPREP, and additional water after each dose.     DOSE 1--Day Before Colonoscopy 04/21/25     Drink at least 6 to 8 glasses of clear liquids from time you wake up until you begin your prep and then continue until bedtime to avoid dehydration.      12:00 pm (NOON) Take four (4) Dulcolax (Bisacodyl) tablets with at least 8 ounces or more of clear liquids.       6:00 pm:     You must complete Steps 1 through 4 using one (1) 6-ounce bottle before going to bed as shown below:     Step 1-Pour ONE (1) 6-ounce bottle of SUPREP liquid into the mixing container.  Step 2-Add cool drinking water to the 16-ounce line on the container and mix.  Step 3-Drink ALL the liquid in the container.  Step 4-You must drink two (2) more 16-ounce containers of water over the next 1 hour.  IMPORTANT: If you experience preparation-related symptoms (for example, nausea, bloating, or cramping), stop, or slow the rate of drinking the additional water until your symptoms decrease.     DOSE 2--Day of the Colonoscopy 04/22/25 at 2-3 AM     For this dose, repeat Steps 1 through 4 shown above using the other 6-ounce bottle.   You may continue drinking water/clear liquids until (0900 AM).     For more information about your procedure, please note the two items below:     Please watch this informational video. It is important to watch this animated consent video prior to your arrival. If you haven't watched the video prior to arriving, you will be asked to watch it during admission, which can cause delays.      Options for viewing:  Using a keyboard:  press and hold the control tab (Ctrl) and left mouse click to follow link          Colonoscopy Instructional Video                                                        OR     Type link address into your  web browser's address bar:  [email or web address]     2. Educational Booklet Colonoscopy Prep - Liquid             IMPORTANT INFORMATION TO KNOW BEFORE YOUR PROCEDURE  [facility] 2nd Floor     If your procedure requires the administration of anesthesia, it is necessary for a responsible adult to drive you home. (Medical Transportation, Uber, Lyft, Taxi, etc. may ONLY be used if a responsible adult is present to accompany you home.  The responsible adult CANNOT be the  of the service).   person must be available to return to pick you up within 15 minutes of being notified of discharge.     Please bring a picture ID, insurance card, & copayment     Take Medications as directed below:     If you begin taking any new blood thinning medications, injectable weight loss/diabetes medications (other than insulin), or Adipex (phentermine) please contact the endoscopy scheduling department as soon as possible. [phone]     If you are diabetic, see the attached instructions sheet regarding your medication(s).     If you take HEART, BLOOD PRESSURE, SEIZURE, PAIN, LUNG (including inhalers/nebulizers), ANTI-REJECTION (transplant patients) OR PSYCHIATRIC medications, please take at your regular times with a sip of water, unless otherwise directed by the scheduling nurse.     Important contact information:     Endoscopy Scheduling [phone] / Hours of operation Monday-Friday 8:00 AM-4:30 PM     Questions about insurance or financial obligations call [phone] or [phone]     After hours questions requiring immediate assistance, contact [facility] at [phone] or [phone]     Cancellations: Please call [phone] to cancel/reschedule if the need arises. We understand that unpredictable situations may occur that cause you to need to reschedule, but we ask that you let us know as far ahead of time as possible since there is a high  demand for appointments.     NOTE - On occasion, unforeseen circumstances may cause a delay in your procedure start time. We respect your time and appreciate your patience during these circumstances.

## 2025-04-03 DIAGNOSIS — C61 PROSTATE CANCER: ICD-10-CM

## 2025-04-03 NOTE — TELEPHONE ENCOUNTER
----- Message from Liz sent at 4/3/2025  9:53 AM CDT -----  Type:  RX Refill RequestWho Called: Refill or New Rx:refillRX Name and Strength:XTANDI 40 mg TabHow is the patient currently taking it? (ex. 1XDay):Is this a 30 day or 90 day RX:Preferred Pharmacy with phone number:[facility] [address] Phone: [phone]: [phone] or Mail Order:mailOrdering Provider:Dr Fitzpatrick Would the patient rather a call back or a response via [facility]? [facility] Call Back Number: [phone] Information:

## 2025-04-17 ENCOUNTER — TELEPHONE (OUTPATIENT)
Dept: ENDOSCOPY | Facility: HOSPITAL | Age: 70
End: 2025-04-17
Payer: MEDICARE

## 2025-04-17 NOTE — TELEPHONE ENCOUNTER
Called pt to verify date and arrival time of procedure. April 22,2025 1215 . Message as well as call back number left on voicemail.

## 2025-04-21 ENCOUNTER — TELEPHONE (OUTPATIENT)
Dept: ENDOSCOPY | Facility: HOSPITAL | Age: 70
End: 2025-04-21
Payer: MEDICARE

## 2025-04-21 NOTE — TELEPHONE ENCOUNTER
Spoke w/patient and confirmed procedure appt for 4/22/25 and arrival time of 1215.  All questions answered.

## 2025-04-22 ENCOUNTER — HOSPITAL ENCOUNTER (OUTPATIENT)
Facility: HOSPITAL | Age: 70
Discharge: HOME | End: 2025-04-22
Attending: INTERNAL MEDICINE | Admitting: INTERNAL MEDICINE
Payer: MEDICARE

## 2025-04-22 ENCOUNTER — ANESTHESIA (OUTPATIENT)
Dept: ENDOSCOPY | Facility: HOSPITAL | Age: 70
End: 2025-04-22
Payer: MEDICARE

## 2025-04-22 ENCOUNTER — ANESTHESIA EVENT (OUTPATIENT)
Dept: ENDOSCOPY | Facility: HOSPITAL | Age: 70
End: 2025-04-22
Payer: MEDICARE

## 2025-04-22 VITALS
DIASTOLIC BLOOD PRESSURE: 80 MMHG | BODY MASS INDEX: 23.7 KG/M2 | WEIGHT: 160 LBS | HEIGHT: 69 IN | TEMPERATURE: 98 F | OXYGEN SATURATION: 100 % | RESPIRATION RATE: 12 BRPM | HEART RATE: 43 BPM | SYSTOLIC BLOOD PRESSURE: 154 MMHG

## 2025-04-22 DIAGNOSIS — K63.5 COLON POLYP: ICD-10-CM

## 2025-04-22 DIAGNOSIS — Z86.0100 HISTORY OF COLON POLYPS: ICD-10-CM

## 2025-04-22 DIAGNOSIS — Z12.11 SCREEN FOR COLON CANCER: ICD-10-CM

## 2025-04-22 PROCEDURE — 25000003 PHARM REV CODE 250: Performed by: INTERNAL MEDICINE

## 2025-04-22 PROCEDURE — 45385 COLONOSCOPY W/LESION REMOVAL: CPT | Mod: PT,,, | Performed by: INTERNAL MEDICINE

## 2025-04-22 PROCEDURE — 88305 TISSUE EXAM BY PATHOLOGIST: CPT | Mod: TC | Performed by: INTERNAL MEDICINE

## 2025-04-22 PROCEDURE — D9220A PRA ANESTHESIA: Mod: PT,ANES,, | Performed by: ANESTHESIOLOGY

## 2025-04-22 PROCEDURE — 37000009 HC ANESTHESIA EA ADD 15 MINS: Performed by: INTERNAL MEDICINE

## 2025-04-22 PROCEDURE — 37000008 HC ANESTHESIA 1ST 15 MINUTES: Performed by: INTERNAL MEDICINE

## 2025-04-22 PROCEDURE — 45385 COLONOSCOPY W/LESION REMOVAL: CPT | Mod: PT | Performed by: INTERNAL MEDICINE

## 2025-04-22 PROCEDURE — 88305 TISSUE EXAM BY PATHOLOGIST: CPT | Mod: 26,,, | Performed by: STUDENT IN AN ORGANIZED HEALTH CARE EDUCATION/TRAINING PROGRAM

## 2025-04-22 PROCEDURE — 63600175 PHARM REV CODE 636 W HCPCS: Performed by: NURSE ANESTHETIST, CERTIFIED REGISTERED

## 2025-04-22 PROCEDURE — D9220A PRA ANESTHESIA: Mod: PT,CRNA,, | Performed by: NURSE ANESTHETIST, CERTIFIED REGISTERED

## 2025-04-22 PROCEDURE — 27201089 HC SNARE, DISP (ANY): Performed by: INTERNAL MEDICINE

## 2025-04-22 PROCEDURE — 25000003 PHARM REV CODE 250: Performed by: NURSE ANESTHETIST, CERTIFIED REGISTERED

## 2025-04-22 RX ORDER — PROPOFOL 10 MG/ML
VIAL (ML) INTRAVENOUS CONTINUOUS PRN
Status: DISCONTINUED | OUTPATIENT
Start: 2025-04-22 | End: 2025-04-22

## 2025-04-22 RX ORDER — LIDOCAINE HYDROCHLORIDE 20 MG/ML
INJECTION INTRAVENOUS
Status: DISCONTINUED | OUTPATIENT
Start: 2025-04-22 | End: 2025-04-22

## 2025-04-22 RX ORDER — DEXTROMETHORPHAN/PSEUDOEPHED 2.5-7.5/.8
DROPS ORAL
Status: DISCONTINUED | OUTPATIENT
Start: 2025-04-22 | End: 2025-04-22 | Stop reason: HOSPADM

## 2025-04-22 RX ORDER — PROPOFOL 10 MG/ML
VIAL (ML) INTRAVENOUS
Status: DISCONTINUED | OUTPATIENT
Start: 2025-04-22 | End: 2025-04-22

## 2025-04-22 RX ORDER — SODIUM CHLORIDE 9 MG/ML
INJECTION, SOLUTION INTRAVENOUS CONTINUOUS
Status: DISCONTINUED | OUTPATIENT
Start: 2025-04-22 | End: 2025-04-22 | Stop reason: HOSPADM

## 2025-04-22 RX ADMIN — PROPOFOL 50 MG: 10 INJECTION, EMULSION INTRAVENOUS at 02:04

## 2025-04-22 RX ADMIN — LIDOCAINE HYDROCHLORIDE 75 MG: 20 INJECTION, SOLUTION INTRAVENOUS at 02:04

## 2025-04-22 RX ADMIN — SODIUM CHLORIDE: 0.9 INJECTION, SOLUTION INTRAVENOUS at 01:04

## 2025-04-22 RX ADMIN — PROPOFOL 125 MCG/KG/MIN: 10 INJECTION, EMULSION INTRAVENOUS at 02:04

## 2025-04-22 RX ADMIN — PROPOFOL 30 MG: 10 INJECTION, EMULSION INTRAVENOUS at 02:04

## 2025-04-22 NOTE — ANESTHESIA PREPROCEDURE EVALUATION
04/22/2025  Jm Gilliam Jr. is a 69 y.o., male here for a colonoscopy.       Pre-op Assessment    I have reviewed the Patient Summary Reports.    I have reviewed the NPO Status.   I have reviewed the Medications.     Review of Systems  Anesthesia Hx:  No problems with previous Anesthesia               Denies Personal Hx of Anesthesia complications.                    Social:  Non-Smoker, No Alcohol Use       Cardiovascular:     Hypertension               Sinus bradycardia                               Physical Exam  General: Well nourished, Cooperative, Alert and Oriented    Airway:  Mallampati: II   Mouth Opening: Normal  TM Distance: Normal  Tongue: Normal  Neck ROM: Normal ROM    Chest/Lungs:  Clear to auscultation, Normal Respiratory Rate    Heart:  Rate: Normal  Rhythm: Regular Rhythm        Anesthesia Plan  Type of Anesthesia, risks & benefits discussed:    Anesthesia Type: Gen Natural Airway  Intra-op Monitoring Plan: Standard ASA Monitors  Post Op Pain Control Plan: multimodal analgesia  Induction:  IV  Informed Consent: Informed consent signed with the Patient and all parties understand the risks and agree with anesthesia plan.  All questions answered.   ASA Score: 2    Ready For Surgery From Anesthesia Perspective.     .

## 2025-04-22 NOTE — PROVATION PATIENT INSTRUCTIONS
Discharge Summary/Instructions after an Endoscopic Procedure  Patient Name: Jm Gilliam  Patient MRN: [id number]  Patient YOB: 1955  Tuesday, April 22, 2025  Lavon Solis MD  Dear patient,  As a result of recent federal legislation (The Federal Cures Act), you may   receive lab or pathology results from your procedure in your [facility]   account before your physician is able to contact you. Your physician or   their representative will relay the results to you with their   recommendations at their soonest availability.  Thank you,  Your health is very important to us during the Covid Crisis. Following your   procedure today, you will receive a daily text for 2 weeks asking about   signs or symptoms of Covid 19.  Please respond to this text when you   receive it so we can follow up and keep you as safe as possible.   RESTRICTIONS:  During your procedure today, you received medications for sedation.  These   medications may affect your judgment, balance and coordination.  Therefore,   for 24 hours, you have the following restrictions:   - DO NOT drive a car, operate machinery, make legal/financial decisions,   sign important papers or drink alcohol.    ACTIVITY:  Today: no heavy lifting, straining or running due to procedural   sedation/anesthesia.  The following day: return to full activity including work.  DIET:  Eat and drink normally unless instructed otherwise.     TREATMENT FOR COMMON SIDE EFFECTS:  - Mild abdominal pain, nausea, belching, bloating or excessive gas:  rest,   eat lightly and use a heating pad.  - Sore Throat: treat with throat lozenges and/or gargle with warm salt   water.  - Because air was used during the procedure, expelling large amounts of air   from your rectum or belching is normal.  - If a bowel prep was taken, you may not have a bowel movement for 1-3 days.    This is normal.  SYMPTOMS TO WATCH FOR AND REPORT TO YOUR PHYSICIAN:  1. Abdominal pain or bloating,  other than gas cramps.  2. Chest pain.  3. Back pain.  4. Signs of infection such as: chills or fever occurring within 24 hours   after the procedure.  5. Rectal bleeding, which would show as bright red, maroon, or black stools.   (A tablespoon of blood from the rectum is not serious, especially if   hemorrhoids are present.)  6. Vomiting.  7. Weakness or dizziness.  GO DIRECTLY TO THE NEAREST EMERGENCY ROOM IF YOU HAVE ANY OF THE FOLLOWING:      Difficulty breathing              Chills and/or fever over 101 F   Persistent vomiting and/or vomiting blood   Severe abdominal pain   Severe chest pain   Black, tarry stools   Bleeding- more than one tablespoon   Any other symptom or condition that you feel may need urgent attention  Your doctor recommends these additional instructions:  If any biopsies were taken, your doctors clinic will contact you in 1 to 2   weeks with any results.  - Discharge patient to home (ambulatory).   - Patient has a contact number available for emergencies.  The signs and   symptoms of potential delayed complications were discussed with the   patient.  Return to normal activities tomorrow.  Written discharge   instructions were provided to the patient.   - Resume previous diet.   - Continue present medications.   - Return to primary care physician as previously scheduled.   - Repeat colonoscopy in 5 years for surveillance.  For questions, problems or results please call your physician - Lavon Solis MD.  EMERGENCY PHONE NUMBER: [phone],  LAB RESULTS: [phone]  IF A COMPLICATION OR EMERGENCY SITUATION ARISES AND YOU ARE UNABLE TO REACH   YOUR PHYSICIAN - GO DIRECTLY TO THE EMERGENCY ROOM.  Lavon Solis MD  4/22/2025 2:28:17 PM  This report has been verified and signed electronically.  Dear patient,  As a result of recent federal legislation (The Federal Cures Act), you may   receive lab or pathology results from your procedure in your [facility]   account  before your physician is able to contact you. Your physician or   their representative will relay the results to you with their   recommendations at their soonest availability.  Thank you,  PROVATION

## 2025-04-22 NOTE — H&P
Short Stay Endoscopy History and Physical    PCP - Demetri Thomason MD    Procedure - Colonoscopy  ASA - III  Mallampati - per anesthesia  History of Anesthesia problems - no  Family history Anesthesia problems - no     HPI:  This is a 69 y.o. male here for evaluation of : Colon polyps    Pt here today for surveillance of prior colon polyps. The most recent exam was in 2021, at which time patients recalls having polyps removed. Since patient denies change in bowel habits or overt blood in stool. Denies weight loss.     ROS:  Constitutional: No fevers, chills, No weight loss  ENT: No allergies  CV: No chest pain  Pulm: No shortness of breath  GI: see HPI  Derm: No rash    Medical History:  has a past medical history of HTN (hypertension), Prostate cancer (2/8/2022), and Sinus bradycardia (9/14/2021).    Surgical History:  has a past surgical history that includes hand surgery; Hernia repair (Left, 2005); and Colonoscopy (N/A, 12/22/2021).    Family History: family history is not on file.. Otherwise no colon cancer, inflammatory bowel disease, or GI malignancies.    Social History:  reports that he has never smoked. He has never used smokeless tobacco. He reports current drug use. Drug: Marijuana. He reports that he does not drink alcohol.    Review of patient's allergies indicates:   Allergen Reactions    Nyquil liquicaps Swelling       Medications:   Prescriptions Prior to Admission[1]      Objective Findings:    Vital Signs: see nursing notes  Physical Exam:  General Appearance: Well appearing in no acute distress  Eyes:    No scleral icterus  ENT: Neck supple  Lungs: CTA anteriorly  Heart:  S1, S2 normal, no murmurs heard  Abdomen: Soft, non tender, non distended with positive bowel sounds. No hepatosplenomegaly, ascites, or mass  Extremities: no edema  Skin: No rash      Labs:  Lab Results   Component Value Date    WBC 4.07 11/06/2023    HGB 11.7 (L) 11/06/2023    HCT 36.5 (L) 11/06/2023     11/06/2023     CHOL 207 (H) 09/29/2021    TRIG 88 09/29/2021    HDL 65 09/29/2021    ALT 8 (L) 11/06/2023    AST 12 11/06/2023     11/06/2023    K 4.6 11/06/2023     11/06/2023    CREATININE 1.1 11/06/2023    BUN 15 11/06/2023    CO2 26 11/06/2023    TSH 1.116 09/29/2021    .3 (H) 09/29/2021       I have explained the risks and benefits of endoscopy procedures to the patient including but not limited to bleeding, perforation, infection, and death.    Lavon Solis MD         [1]   Medications Prior to Admission   Medication Sig Dispense Refill Last Dose/Taking    amLODIPine (NORVASC) 10 MG tablet Take 1 tablet (10 mg total) by mouth once daily. 30 tablet 11     enzalutamide (XTANDI) 40 mg Cap Take 160 mg by mouth once daily Take as directed by physician.. 120 capsule 2     flu vac 2022 [id number],PF, (FLUAD QUAD 2022-23,65Y UP,,PF,) 60 mcg (15 mcg x 4)/0.5 mL Syrg Inject into the muscle. (Patient not taking: Reported on 4/9/2024) 0.5 mL 0     tamsulosin (FLOMAX) 0.4 mg Cap TAKE 1 CAPSULE(0.4 MG) BY MOUTH EVERY DAY (Patient not taking: Reported on 4/9/2024) 90 capsule 3     XTANDI 40 mg Tab Reship- Take 4 tablets (160mg) by mouth once daily as directed by physician. 120 tablet 0

## 2025-04-22 NOTE — TRANSFER OF CARE
"Anesthesia Transfer of Care Note    Patient: Jm Gilliam Jr.    Procedure(s) Performed: Procedure(s) (LRB):  COLONOSCOPY, SCREENING, HIGH RISK PATIENT (N/A)    Patient location: GI    Anesthesia Type: general    Transport from OR: Transported from OR on room air with adequate spontaneous ventilation    Post pain: adequate analgesia    Post assessment: no apparent anesthetic complications and tolerated procedure well    Post vital signs: stable    Level of consciousness: awake    Nausea/Vomiting: no nausea/vomiting    Complications: none    Transfer of care protocol was followed      Last vitals: Visit Vitals  BP (!) 159/101   Pulse (!) 51   Temp 36.8 °C (98.2 °F)   Resp 20   Ht 5' 9" (1.753 m)   Wt 72.6 kg (160 lb)   SpO2 100%   BMI 23.63 kg/m²     "

## 2025-04-23 NOTE — ANESTHESIA POSTPROCEDURE EVALUATION
Anesthesia Post Evaluation    Patient: Jm Gilliam JrMargaret    Procedure(s) Performed: Procedure(s) (LRB):  COLONOSCOPY, SCREENING, HIGH RISK PATIENT (N/A)    Final Anesthesia Type: general      Patient location during evaluation: GI PACU  Patient participation: Yes- Able to Participate  Level of consciousness: awake and alert, oriented and awake  Post-procedure vital signs: reviewed and stable  Pain management: adequate  Airway patency: patent    PONV status at discharge: No PONV  Anesthetic complications: no      Cardiovascular status: stable  Respiratory status: unassisted and room air  Hydration status: euvolemic  Follow-up not needed.              Vitals Value Taken Time   /80 04/22/25 15:00   Temp 36.2 04/23/25 09:31   Pulse 43 04/22/25 15:00   Resp 12 04/22/25 15:00   SpO2 100 % 04/22/25 15:00         Event Time   Out of Recovery 15:00:30         Pain/Charly Score: Charly Score: 10 (4/22/2025  3:00 PM)

## 2025-04-24 LAB
ESTROGEN SERPL-MCNC: NORMAL PG/ML
INSULIN SERPL-ACNC: NORMAL U[IU]/ML
LAB AP CLINICAL INFORMATION: NORMAL
LAB AP GROSS DESCRIPTION: NORMAL
LAB AP PERFORMING LOCATION(S): NORMAL
LAB AP REPORT FOOTNOTES: NORMAL

## 2025-04-25 ENCOUNTER — RESULTS FOLLOW-UP (OUTPATIENT)
Dept: GASTROENTEROLOGY | Facility: HOSPITAL | Age: 70
End: 2025-04-25

## 2025-05-03 NOTE — TELEPHONE ENCOUNTER
Care Due:                  Date            Visit Type   Department     Provider  --------------------------------------------------------------------------------                                MYCHART                              ANNUAL                              CHECKUP/PHY  [facility] FAMILY  Last Visit: 05-      [facility]       Demetri Thomason  Next Visit: None Scheduled  None         None Found                                                            Last  Test          Frequency    Reason                     Performed    Due Date  --------------------------------------------------------------------------------    Office Visit  15 months..  amLODIPine...............  05- 08-    [facility] Embedded Care Due Messages. Reference number: [id number].   5/03/2025 6:54:56 PM CDT

## 2025-05-05 RX ORDER — AMLODIPINE BESYLATE 10 MG/1
10 TABLET ORAL DAILY
Qty: 30 TABLET | Refills: 11 | Status: SHIPPED | OUTPATIENT
Start: 2025-05-05

## 2025-06-04 ENCOUNTER — HOSPITAL ENCOUNTER (OUTPATIENT)
Dept: RADIOLOGY | Facility: HOSPITAL | Age: 70
Discharge: HOME | End: 2025-06-04
Attending: INTERNAL MEDICINE
Payer: MEDICARE

## 2025-06-04 DIAGNOSIS — C61 PROSTATE CANCER: ICD-10-CM

## 2025-06-04 DIAGNOSIS — M81.6 LOCALIZED OSTEOPOROSIS (LEQUESNE): ICD-10-CM

## 2025-06-04 PROCEDURE — 77080 DXA BONE DENSITY AXIAL: CPT | Mod: TC

## 2025-06-04 PROCEDURE — 77080 DXA BONE DENSITY AXIAL: CPT | Mod: 26,,, | Performed by: RADIOLOGY

## 2025-06-11 ENCOUNTER — OFFICE VISIT (OUTPATIENT)
Dept: FAMILY MEDICINE | Facility: CLINIC | Age: 70
End: 2025-06-11
Payer: MEDICARE

## 2025-06-11 VITALS
DIASTOLIC BLOOD PRESSURE: 86 MMHG | HEIGHT: 69 IN | BODY MASS INDEX: 23.97 KG/M2 | SYSTOLIC BLOOD PRESSURE: 130 MMHG | OXYGEN SATURATION: 98 % | TEMPERATURE: 98 F | HEART RATE: 48 BPM | WEIGHT: 161.81 LBS

## 2025-06-11 DIAGNOSIS — D49.9 IMMUNODEFICIENCY SECONDARY TO NEOPLASM: ICD-10-CM

## 2025-06-11 DIAGNOSIS — Z79.899 OTHER LONG TERM (CURRENT) DRUG THERAPY: ICD-10-CM

## 2025-06-11 DIAGNOSIS — C61 PROSTATE CANCER: ICD-10-CM

## 2025-06-11 DIAGNOSIS — I10 PRIMARY HYPERTENSION: Primary | ICD-10-CM

## 2025-06-11 DIAGNOSIS — D84.81 IMMUNODEFICIENCY SECONDARY TO NEOPLASM: ICD-10-CM

## 2025-06-11 DIAGNOSIS — I70.0 AORTIC ATHEROSCLEROSIS: ICD-10-CM

## 2025-06-11 DIAGNOSIS — R00.1 SINUS BRADYCARDIA: ICD-10-CM

## 2025-06-11 PROCEDURE — G2211 COMPLEX E/M VISIT ADD ON: HCPCS | Mod: ,,, | Performed by: FAMILY MEDICINE

## 2025-06-11 PROCEDURE — 99999 PR PBB SHADOW E&M-EST. PATIENT-LVL IV: CPT | Mod: PBBFAC,,, | Performed by: FAMILY MEDICINE

## 2025-06-11 PROCEDURE — 99214 OFFICE O/P EST MOD 30 MIN: CPT | Mod: S$PBB,,, | Performed by: FAMILY MEDICINE

## 2025-06-11 PROCEDURE — 99214 OFFICE O/P EST MOD 30 MIN: CPT | Mod: PBBFAC,PO | Performed by: FAMILY MEDICINE

## 2025-06-11 NOTE — PATIENT INSTRUCTIONS
Check with your pharmacy regarding getting the tetanus (Tdap) vaccine (once every 10 years)    Look into getting the Shingles vaccine (SHINGRIX) at your local pharmacy (2 part series, done once at/after age 50)    Orders Placed This Encounter   Procedures    CBC Auto Differential    Comprehensive Metabolic Panel    Lipid Panel    TSH    Prostate Specific Antigen, Diagnostic    Hemoglobin A1C

## 2025-06-11 NOTE — PROGRESS NOTES
(Portions of this note were dictated using voice recognition software and may contain dictation related errors in spelling/grammar/syntax not found on text review)    CC:   Chief Complaint   Patient presents with    Annual Exam       HPI: 69 y.o. male     Hypertension, amlodipine 10 mg daily      Does a lot of laborious activity for exercise.  [place].  Denies any tobacco or alcohol but does state that he smokes marijuana daily, mainly to help with pains, anxiety, and sleep.  Feels like he is functioning quite well on this and denies any adverse effects.       Significant bradycardia noted on prior evaluation.  Asymptomatic, does a lot of work as above, usually has no problems.  Did have an episode during the summertime in the heat when he was doing a lot of work outside and passed out but this has never happened again and feels like he was probably just dehydrated at that time.  Denies any chest pain or shortness of breath.  Every so often he will notice some occasional heart racing symptoms when he lies down but this is very rare.  Denies any chest pain or shortness and breath at this time.     prostate cancer with metastatic disease to lymph node. Had radiotherapy,  On ADT, Started Lupron, currently on Xtandi.  Was started with Flonase to help with nocturia but not currently taking..  Last Hematology-Oncology visit was 11/06/2023.  Does get hot flashes    Past Medical History:   Diagnosis Date    HTN (hypertension)     Prostate cancer 2/8/2022    Sinus bradycardia 9/14/2021       Past Surgical History:   Procedure Laterality Date    COLONOSCOPY N/A 12/22/2021    Procedure: COLONOSCOPY Suprep Vaccinated;  Surgeon: Lavon Solis MD;  Location: [facility];  Service: Endoscopy;  Laterality: N/A;    COLONOSCOPY, SCREENING, HIGH RISK PATIENT N/A 4/22/2025    Procedure: COLONOSCOPY, SCREENING, HIGH RISK PATIENT;  Surgeon: Lavon Solis MD;  Location: [facility];  Service:  Endoscopy;  Laterality: N/A;  pt was due for colonoscopy 12/2024 per last procedure report / requests Dr. Solis / jeannie / instr to portal. DBM    hand surgery      for accident left hand    HERNIA REPAIR Left 2005       Family History   Problem Relation Name Age of Onset    Diabetes Neg Hx      Cancer Neg Hx      Hypertension Neg Hx         Social History     Tobacco Use    Smoking status: Never    Smokeless tobacco: Never   Substance Use Topics    Alcohol use: Never    Drug use: Yes     Types: Marijuana       Lab Results   Component Value Date    WBC 4.07 11/06/2023    HGB 11.7 (L) 11/06/2023    HCT 36.5 (L) 11/06/2023    MCV 90 11/06/2023     11/06/2023    CHOL 207 (H) 09/29/2021    TRIG 88 09/29/2021    HDL 65 09/29/2021    ALT 8 (L) 11/06/2023    AST 12 11/06/2023    BILITOT 0.2 11/06/2023    ALKPHOS 92 11/06/2023     11/06/2023    K 4.6 11/06/2023     11/06/2023    CREATININE 1.1 11/06/2023    ESTGFRAFRICA 59.3 (A) 11/24/2021    EGFRNONAA 51.3 (A) 11/24/2021    CALCIUM 9.7 11/06/2023    ALBUMIN 3.7 11/06/2023    BUN 15 11/06/2023    CO2 26 11/06/2023    TSH 1.116 09/29/2021    .3 (H) 09/29/2021    PSADIAG 0.04 11/06/2023    LDLCALC 124.4 09/29/2021    GLU 85 11/06/2023             Vital signs reviewed  PE:   APPEARANCE: Well nourished, well developed, in no acute distress.    HEAD: Normocephalic, atraumatic.  EYES: PERRL. EOMI.   Conjunctivae noninjected.  EARS: TM's intact. Light reflex normal. No retraction or perforation  NOSE: Mucosa pink. Airway clear.  MOUTH & THROAT: No tonsillar enlargement. No pharyngeal erythema or exudate.   NECK: Supple with no cervical lymphadenopathy.   no carotid bruits.  No thyromegaly  CHEST: Good inspiratory effort. Lungs clear to auscultation with no wheezes or crackles.  CARDIOVASCULAR: Normal S1, S2.  Bradycardic with ectopy noted.  Nondisplaced PMI.  ABDOMEN: Bowel sounds normal. Not distended. Soft. No tenderness or masses. No  organomegaly.  EXTREMITIES: No edema     IMPRESSION  1. Primary hypertension    2. Prostate cancer    3. Aortic atherosclerosis    4. Sinus bradycardia    5. Other long term (current) drug therapy    6. Immunodeficiency secondary to neoplasm                PLAN  Orders Placed This Encounter   Procedures    CBC Auto Differential    Comprehensive Metabolic Panel    Lipid Panel    TSH    Prostate Specific Antigen, Diagnostic    Hemoglobin A1C    Ambulatory referral/consult to Hematology / Oncology      Blood pressure stable.  Continue current therapy     Pulse low but stable, generally asymptomatic.  If he notices increasing issues with palpitation symptoms can get Holter/event monitoring ordered.  Prior echo 2021 normal.    Prostate cancer history, continues on Xtandi.  Needs updated labs.  Has not seen his oncologist in 2 years, will place referral to get back in for follow up              SCREENINGS      Immunizations:   Tdap 2015, can get update at pharmacy  COVID-19 vaccine booster eligible  Pneumovax in 2022  Look into getting the Shingles vaccine (SHINGRIX) at your local pharmacy (2 part series, done once at/after age 50)        Age/demographic appropriate health maintenance:  Colonoscopy 2025.  Multiple polyps (123 mm) sigmoid colon.  5 year follow up recommended  Prostate cancer surveillance through Oncology, overdue

## 2025-06-12 ENCOUNTER — LAB VISIT (OUTPATIENT)
Dept: LAB | Facility: HOSPITAL | Age: 70
End: 2025-06-12
Attending: FAMILY MEDICINE
Payer: MEDICARE

## 2025-06-12 DIAGNOSIS — C61 PROSTATE CANCER: ICD-10-CM

## 2025-06-12 DIAGNOSIS — Z79.899 OTHER LONG TERM (CURRENT) DRUG THERAPY: ICD-10-CM

## 2025-06-12 DIAGNOSIS — I70.0 AORTIC ATHEROSCLEROSIS: ICD-10-CM

## 2025-06-12 DIAGNOSIS — R00.1 SINUS BRADYCARDIA: ICD-10-CM

## 2025-06-12 DIAGNOSIS — I10 PRIMARY HYPERTENSION: ICD-10-CM

## 2025-06-12 LAB
ABSOLUTE EOSINOPHIL (OHS): 0.05 K/UL
ABSOLUTE MONOCYTE (OHS): 0.46 K/UL (ref 0.3–1)
ABSOLUTE NEUTROPHIL COUNT (OHS): 1.82 K/UL (ref 1.8–7.7)
ALBUMIN SERPL BCP-MCNC: 4 G/DL (ref 3.5–5.2)
ALP SERPL-CCNC: 87 UNIT/L (ref 40–150)
ALT SERPL W/O P-5'-P-CCNC: 5 UNIT/L (ref 10–44)
ANION GAP (OHS): 13 MMOL/L (ref 8–16)
AST SERPL-CCNC: 15 UNIT/L (ref 11–45)
BASOPHILS # BLD AUTO: 0.01 K/UL
BASOPHILS NFR BLD AUTO: 0.2 %
BILIRUB SERPL-MCNC: 0.4 MG/DL (ref 0.1–1)
BUN SERPL-MCNC: 16 MG/DL (ref 8–23)
CALCIUM SERPL-MCNC: 9.2 MG/DL (ref 8.7–10.5)
CHLORIDE SERPL-SCNC: 107 MMOL/L (ref 95–110)
CHOLEST SERPL-MCNC: 210 MG/DL (ref 120–199)
CHOLEST/HDLC SERPL: 3.5 {RATIO} (ref 2–5)
CO2 SERPL-SCNC: 21 MMOL/L (ref 23–29)
CREAT SERPL-MCNC: 1.2 MG/DL (ref 0.5–1.4)
EAG (OHS): 120 MG/DL (ref 68–131)
ERYTHROCYTE [DISTWIDTH] IN BLOOD BY AUTOMATED COUNT: 14 % (ref 11.5–14.5)
GFR SERPLBLD CREATININE-BSD FMLA CKD-EPI: >60 ML/MIN/1.73/M2
GLUCOSE SERPL-MCNC: 86 MG/DL (ref 70–110)
HBA1C MFR BLD: 5.8 % (ref 4–5.6)
HCT VFR BLD AUTO: 40.9 % (ref 40–54)
HDLC SERPL-MCNC: 60 MG/DL (ref 40–75)
HDLC SERPL: 28.6 % (ref 20–50)
HGB BLD-MCNC: 12.9 GM/DL (ref 14–18)
IMM GRANULOCYTES # BLD AUTO: 0.01 K/UL (ref 0–0.04)
IMM GRANULOCYTES NFR BLD AUTO: 0.2 % (ref 0–0.5)
LDLC SERPL CALC-MCNC: 125 MG/DL (ref 63–159)
LYMPHOCYTES # BLD AUTO: 1.69 K/UL (ref 1–4.8)
MCH RBC QN AUTO: 29.3 PG (ref 27–31)
MCHC RBC AUTO-ENTMCNC: 31.5 G/DL (ref 32–36)
MCV RBC AUTO: 93 FL (ref 82–98)
NONHDLC SERPL-MCNC: 150 MG/DL
NUCLEATED RBC (/100WBC) (OHS): 0 /100 WBC
PLATELET # BLD AUTO: 297 K/UL (ref 150–450)
PMV BLD AUTO: 10.4 FL (ref 9.2–12.9)
POTASSIUM SERPL-SCNC: 4.2 MMOL/L (ref 3.5–5.1)
PROT SERPL-MCNC: 8 GM/DL (ref 6–8.4)
PSA SERPL-MCNC: 0.01 NG/ML
RBC # BLD AUTO: 4.41 M/UL (ref 4.6–6.2)
RELATIVE EOSINOPHIL (OHS): 1.2 %
RELATIVE LYMPHOCYTE (OHS): 41.8 % (ref 18–48)
RELATIVE MONOCYTE (OHS): 11.4 % (ref 4–15)
RELATIVE NEUTROPHIL (OHS): 45.2 % (ref 38–73)
SODIUM SERPL-SCNC: 141 MMOL/L (ref 136–145)
TRIGL SERPL-MCNC: 125 MG/DL (ref 30–150)
TSH SERPL-ACNC: 0.94 UIU/ML (ref 0.4–4)
WBC # BLD AUTO: 4.04 K/UL (ref 3.9–12.7)

## 2025-06-12 PROCEDURE — 83036 HEMOGLOBIN GLYCOSYLATED A1C: CPT

## 2025-06-12 PROCEDURE — 84443 ASSAY THYROID STIM HORMONE: CPT

## 2025-06-12 PROCEDURE — 84153 ASSAY OF PSA TOTAL: CPT

## 2025-06-12 PROCEDURE — 82040 ASSAY OF SERUM ALBUMIN: CPT

## 2025-06-12 PROCEDURE — 36415 COLL VENOUS BLD VENIPUNCTURE: CPT | Mod: PO

## 2025-06-12 PROCEDURE — 85025 COMPLETE CBC W/AUTO DIFF WBC: CPT

## 2025-06-12 PROCEDURE — 80061 LIPID PANEL: CPT

## 2025-06-13 ENCOUNTER — RESULTS FOLLOW-UP (OUTPATIENT)
Dept: FAMILY MEDICINE | Facility: CLINIC | Age: 70
End: 2025-06-13

## 2025-06-25 NOTE — TELEPHONE ENCOUNTER
No care due was identified.  [facility] Embedded Care Due Messages. Reference number: [id number].   6/25/2025 12:35:48 PM CDT

## 2025-06-26 RX ORDER — AMLODIPINE BESYLATE 10 MG/1
10 TABLET ORAL DAILY
Qty: 30 TABLET | Refills: 11 | Status: SHIPPED | OUTPATIENT
Start: 2025-06-26

## 2025-07-08 DIAGNOSIS — C61 PROSTATE CANCER: ICD-10-CM

## 2025-07-08 RX ORDER — ENZALUTAMIDE 40 MG/1
TABLET ORAL
Qty: 120 TABLET | Refills: 1 | Status: SHIPPED | OUTPATIENT
Start: 2025-07-08

## 2025-07-08 RX ORDER — ENZALUTAMIDE 40 MG/1
TABLET ORAL
Qty: 120 TABLET | Refills: 1 | OUTPATIENT
Start: 2025-07-08

## 2025-07-08 NOTE — TELEPHONE ENCOUNTER
REFILL REQUEST ROUTED TO PROVIDER.     Copied from CRM #[id number]. Topic: Medications - Medication Refill  >> Jul 8, 2025 11:45 AM Neida wrote:  Is this a Refill or New Rx: Refill        Rx Name and Strength:enzalutamide (XTANDI) 40 mg Cap        Preferred Pharmacy with phone number:     [facility] - [place], [place] - [address]  [address]  [facility] [address]  Phone: [phone] Fax: [phone]            Communication Preference: [phone]          Additional Information: New script needed, patient is running low on RX

## 2025-08-04 ENCOUNTER — OFFICE VISIT (OUTPATIENT)
Dept: HEMATOLOGY/ONCOLOGY | Facility: CLINIC | Age: 70
End: 2025-08-04
Payer: MEDICARE

## 2025-08-04 VITALS
BODY MASS INDEX: 23.94 KG/M2 | SYSTOLIC BLOOD PRESSURE: 149 MMHG | HEART RATE: 46 BPM | DIASTOLIC BLOOD PRESSURE: 83 MMHG | HEIGHT: 69 IN | RESPIRATION RATE: 16 BRPM | OXYGEN SATURATION: 98 % | TEMPERATURE: 98 F | WEIGHT: 161.63 LBS

## 2025-08-04 DIAGNOSIS — I10 PRIMARY HYPERTENSION: Primary | ICD-10-CM

## 2025-08-04 DIAGNOSIS — M20.009 FINGER DEFORMITY: ICD-10-CM

## 2025-08-04 DIAGNOSIS — C61 PROSTATE CANCER: ICD-10-CM

## 2025-08-04 PROBLEM — D84.81 IMMUNODEFICIENCY SECONDARY TO NEOPLASM: Status: RESOLVED | Noted: 2024-04-09 | Resolved: 2025-08-04

## 2025-08-04 PROBLEM — D49.9 IMMUNODEFICIENCY SECONDARY TO NEOPLASM: Status: RESOLVED | Noted: 2024-04-09 | Resolved: 2025-08-04

## 2025-08-04 PROCEDURE — G2211 COMPLEX E/M VISIT ADD ON: HCPCS | Mod: ,,, | Performed by: INTERNAL MEDICINE

## 2025-08-04 PROCEDURE — 99214 OFFICE O/P EST MOD 30 MIN: CPT | Mod: PBBFAC | Performed by: INTERNAL MEDICINE

## 2025-08-04 PROCEDURE — 99999 PR PBB SHADOW E&M-EST. PATIENT-LVL IV: CPT | Mod: PBBFAC,,, | Performed by: INTERNAL MEDICINE

## 2025-08-04 PROCEDURE — 99215 OFFICE O/P EST HI 40 MIN: CPT | Mod: S$PBB,,, | Performed by: INTERNAL MEDICINE
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
## 2025-08-12 ENCOUNTER — TELEPHONE (OUTPATIENT)
Facility: CLINIC | Age: 70
End: 2025-08-12
Payer: MEDICARE

## 2025-08-14 ENCOUNTER — CLINICAL SUPPORT (OUTPATIENT)
Dept: ORTHOPEDICS | Facility: CLINIC | Age: 70
End: 2025-08-14
Payer: MEDICARE

## 2025-08-14 ENCOUNTER — OFFICE VISIT (OUTPATIENT)
Facility: CLINIC | Age: 70
End: 2025-08-14
Payer: MEDICARE

## 2025-08-14 ENCOUNTER — HOSPITAL ENCOUNTER (OUTPATIENT)
Dept: RADIOLOGY | Facility: HOSPITAL | Age: 70
Discharge: HOME | End: 2025-08-14
Payer: MEDICARE

## 2025-08-14 VITALS — WEIGHT: 161.63 LBS | HEIGHT: 69 IN | BODY MASS INDEX: 23.94 KG/M2

## 2025-08-14 DIAGNOSIS — G56.22 CUBITAL TUNNEL SYNDROME ON LEFT: ICD-10-CM

## 2025-08-14 DIAGNOSIS — M20.009 FINGER DEFORMITY: ICD-10-CM

## 2025-08-14 DIAGNOSIS — M20.032 SWAN-NECK DEFORMITY OF FINGER OF LEFT HAND: ICD-10-CM

## 2025-08-14 DIAGNOSIS — M79.642 LEFT HAND PAIN: ICD-10-CM

## 2025-08-14 DIAGNOSIS — M20.009 FINGER DEFORMITY: Primary | ICD-10-CM

## 2025-08-14 DIAGNOSIS — G56.02 CARPAL TUNNEL SYNDROME ON LEFT: Primary | ICD-10-CM

## 2025-08-14 PROCEDURE — 99213 OFFICE O/P EST LOW 20 MIN: CPT | Mod: PBBFAC,25

## 2025-08-14 PROCEDURE — 73130 X-RAY EXAM OF HAND: CPT | Mod: TC,LT

## 2025-08-14 PROCEDURE — 99999 PR PBB SHADOW E&M-EST. PATIENT-LVL III: CPT | Mod: PBBFAC,,,

## 2025-09-02 RX ORDER — AMLODIPINE BESYLATE 10 MG/1
10 TABLET ORAL DAILY
Qty: 90 TABLET | Refills: 3 | Status: SHIPPED | OUTPATIENT
Start: 2025-09-02